# Patient Record
Sex: FEMALE
[De-identification: names, ages, dates, MRNs, and addresses within clinical notes are randomized per-mention and may not be internally consistent; named-entity substitution may affect disease eponyms.]

---

## 2020-07-06 ENCOUNTER — HOSPITAL ENCOUNTER (OUTPATIENT)
Dept: HOSPITAL 88 - RAD | Age: 65
End: 2020-07-06
Attending: INTERNAL MEDICINE
Payer: COMMERCIAL

## 2020-07-06 DIAGNOSIS — H02.402: Primary | ICD-10-CM

## 2020-07-06 PROCEDURE — 71046 X-RAY EXAM CHEST 2 VIEWS: CPT

## 2020-07-06 NOTE — DIAGNOSTIC IMAGING REPORT
EXAMINATION:  CHEST 2 VIEWS    



INDICATION: Ptosis



COMPARISON: None

     

FINDINGS:



LINES/TUBES:None



LUNGS:The lungs are well-inflated. No focal consolidation or pulmonary edema.



PLEURA:No pleural effusion or pneumothorax.



MEDIASTINUM:The cardiomediastinal silhouette appears normal in size and shape.



BONES/SOFT TISSUES:No acute osseous injury.



ABDOMEN:No free air under the diaphragm. Surgical clips overlie the upper

abdomen.





IMPRESSION: 

No focal pneumonia or pulmonary edema.



Signed by: Nicky Hermosillo MD on 7/6/2020 3:59 PM

## 2020-07-16 ENCOUNTER — HOSPITAL ENCOUNTER (INPATIENT)
Dept: HOSPITAL 88 - ER | Age: 65
LOS: 1 days | Discharge: HOME | DRG: 176 | End: 2020-07-17
Attending: INTERNAL MEDICINE | Admitting: INTERNAL MEDICINE
Payer: COMMERCIAL

## 2020-07-16 VITALS — SYSTOLIC BLOOD PRESSURE: 119 MMHG | DIASTOLIC BLOOD PRESSURE: 76 MMHG

## 2020-07-16 VITALS — SYSTOLIC BLOOD PRESSURE: 123 MMHG | DIASTOLIC BLOOD PRESSURE: 75 MMHG

## 2020-07-16 VITALS — DIASTOLIC BLOOD PRESSURE: 75 MMHG | SYSTOLIC BLOOD PRESSURE: 123 MMHG

## 2020-07-16 VITALS — DIASTOLIC BLOOD PRESSURE: 76 MMHG | SYSTOLIC BLOOD PRESSURE: 128 MMHG

## 2020-07-16 VITALS — HEIGHT: 65 IN | WEIGHT: 291.31 LBS | BODY MASS INDEX: 48.54 KG/M2

## 2020-07-16 VITALS — SYSTOLIC BLOOD PRESSURE: 128 MMHG | DIASTOLIC BLOOD PRESSURE: 76 MMHG

## 2020-07-16 VITALS — DIASTOLIC BLOOD PRESSURE: 50 MMHG | SYSTOLIC BLOOD PRESSURE: 114 MMHG

## 2020-07-16 DIAGNOSIS — E03.9: ICD-10-CM

## 2020-07-16 DIAGNOSIS — Z11.59: ICD-10-CM

## 2020-07-16 DIAGNOSIS — I26.99: Primary | ICD-10-CM

## 2020-07-16 DIAGNOSIS — Z88.8: ICD-10-CM

## 2020-07-16 DIAGNOSIS — R73.03: ICD-10-CM

## 2020-07-16 DIAGNOSIS — L40.50: ICD-10-CM

## 2020-07-16 DIAGNOSIS — R10.9: ICD-10-CM

## 2020-07-16 DIAGNOSIS — N17.9: ICD-10-CM

## 2020-07-16 DIAGNOSIS — N18.2: ICD-10-CM

## 2020-07-16 DIAGNOSIS — E66.01: ICD-10-CM

## 2020-07-16 DIAGNOSIS — Z80.0: ICD-10-CM

## 2020-07-16 DIAGNOSIS — Z82.49: ICD-10-CM

## 2020-07-16 DIAGNOSIS — I12.9: ICD-10-CM

## 2020-07-16 LAB
ALBUMIN SERPL-MCNC: 3.4 G/DL (ref 3.5–5)
ALBUMIN/GLOB SERPL: 0.8 {RATIO} (ref 0.8–2)
ALP SERPL-CCNC: 95 IU/L (ref 40–150)
ALT SERPL-CCNC: 12 IU/L (ref 0–55)
ANION GAP SERPL CALC-SCNC: 15.1 MMOL/L (ref 8–16)
BACTERIA URNS QL MICRO: (no result) /HPF
BASOPHILS # BLD AUTO: 0.1 10*3/UL (ref 0–0.1)
BASOPHILS NFR BLD AUTO: 0.5 % (ref 0–1)
BILIRUB UR QL: NEGATIVE
BUN SERPL-MCNC: 15 MG/DL (ref 7–26)
BUN/CREAT SERPL: 13 (ref 6–25)
CALCIUM SERPL-MCNC: 9.2 MG/DL (ref 8.4–10.2)
CHLORIDE SERPL-SCNC: 104 MMOL/L (ref 98–107)
CK MB SERPL-MCNC: 1.2 NG/ML (ref 0–5)
CK MB SERPL-MCNC: 1.3 NG/ML (ref 0–5)
CK MB SERPL-MCNC: 1.4 NG/ML (ref 0–5)
CK SERPL-CCNC: 49 IU/L (ref 29–168)
CK SERPL-CCNC: 58 IU/L (ref 29–168)
CK SERPL-CCNC: 66 IU/L (ref 29–168)
CLARITY UR: CLEAR
CO2 SERPL-SCNC: 26 MMOL/L (ref 22–29)
COLOR UR: YELLOW
DEPRECATED NEUTROPHILS # BLD AUTO: 11.5 10*3/UL (ref 2.1–6.9)
DEPRECATED RBC URNS MANUAL-ACNC: (no result) /HPF (ref 0–5)
EGFRCR SERPLBLD CKD-EPI 2021: 46 ML/MIN (ref 60–?)
EOSINOPHIL # BLD AUTO: 0.6 10*3/UL (ref 0–0.4)
EOSINOPHIL NFR BLD AUTO: 3.3 % (ref 0–6)
EPI CELLS URNS QL MICRO: (no result) /LPF
ERYTHROCYTE [DISTWIDTH] IN CORD BLOOD: 15.8 % (ref 11.7–14.4)
GLOBULIN PLAS-MCNC: 4.2 G/DL (ref 2.3–3.5)
GLUCOSE SERPLBLD-MCNC: 149 MG/DL (ref 74–118)
HCT VFR BLD AUTO: 41.8 % (ref 34.2–44.1)
HGB BLD-MCNC: 13.3 G/DL (ref 12–16)
KETONES UR QL STRIP.AUTO: NEGATIVE
LEUKOCYTE ESTERASE UR QL STRIP.AUTO: NEGATIVE
LIPASE SERPL-CCNC: 37 U/L (ref 8–78)
LYMPHOCYTES # BLD: 2.4 10*3/UL (ref 1–3.2)
LYMPHOCYTES NFR BLD AUTO: 14.7 % (ref 18–39.1)
MCH RBC QN AUTO: 28.4 PG (ref 28–32)
MCHC RBC AUTO-ENTMCNC: 31.8 G/DL (ref 31–35)
MCV RBC AUTO: 89.1 FL (ref 81–99)
MONOCYTES # BLD AUTO: 1.9 10*3/UL (ref 0.2–0.8)
MONOCYTES NFR BLD AUTO: 11.2 % (ref 4.4–11.3)
NEUTS SEG NFR BLD AUTO: 69.7 % (ref 38.7–80)
NITRITE UR QL STRIP.AUTO: NEGATIVE
PLATELET # BLD AUTO: 415 X10E3/UL (ref 140–360)
POTASSIUM SERPL-SCNC: 4.1 MMOL/L (ref 3.5–5.1)
PROT UR QL STRIP.AUTO: (no result)
RBC # BLD AUTO: 4.69 X10E6/UL (ref 3.6–5.1)
SODIUM SERPL-SCNC: 141 MMOL/L (ref 136–145)
SP GR UR STRIP: 1.01 (ref 1.01–1.02)
UROBILINOGEN UR STRIP-MCNC: 0.2 MG/DL (ref 0.2–1)
WBC #/AREA URNS HPF: (no result) /HPF (ref 0–5)

## 2020-07-16 PROCEDURE — 71045 X-RAY EXAM CHEST 1 VIEW: CPT

## 2020-07-16 PROCEDURE — 85025 COMPLETE CBC W/AUTO DIFF WBC: CPT

## 2020-07-16 PROCEDURE — 93306 TTE W/DOPPLER COMPLETE: CPT

## 2020-07-16 PROCEDURE — 74177 CT ABD & PELVIS W/CONTRAST: CPT

## 2020-07-16 PROCEDURE — 80053 COMPREHEN METABOLIC PANEL: CPT

## 2020-07-16 PROCEDURE — 82550 ASSAY OF CK (CPK): CPT

## 2020-07-16 PROCEDURE — 84484 ASSAY OF TROPONIN QUANT: CPT

## 2020-07-16 PROCEDURE — 36415 COLL VENOUS BLD VENIPUNCTURE: CPT

## 2020-07-16 PROCEDURE — 94640 AIRWAY INHALATION TREATMENT: CPT

## 2020-07-16 PROCEDURE — 99284 EMERGENCY DEPT VISIT MOD MDM: CPT

## 2020-07-16 PROCEDURE — 83690 ASSAY OF LIPASE: CPT

## 2020-07-16 PROCEDURE — 82553 CREATINE MB FRACTION: CPT

## 2020-07-16 PROCEDURE — 71260 CT THORAX DX C+: CPT

## 2020-07-16 PROCEDURE — 81001 URINALYSIS AUTO W/SCOPE: CPT

## 2020-07-16 PROCEDURE — 80048 BASIC METABOLIC PNL TOTAL CA: CPT

## 2020-07-16 RX ADMIN — LISINOPRIL SCH MG: 10 TABLET ORAL at 16:04

## 2020-07-16 RX ADMIN — Medication SCH MG: at 13:04

## 2020-07-16 RX ADMIN — DEXAMETHASONE SODIUM PHOSPHATE SCH MG: 4 INJECTION, SOLUTION INTRAMUSCULAR; INTRAVENOUS at 11:24

## 2020-07-16 RX ADMIN — Medication SCH ML: at 19:35

## 2020-07-16 RX ADMIN — SODIUM CHLORIDE SCH MLS/HR: 900 INJECTION, SOLUTION INTRAVENOUS at 11:24

## 2020-07-16 RX ADMIN — Medication SCH MG: at 21:21

## 2020-07-16 RX ADMIN — APIXABAN SCH MG: 5 TABLET, FILM COATED ORAL at 16:04

## 2020-07-16 RX ADMIN — Medication SCH MG: at 11:24

## 2020-07-16 RX ADMIN — Medication SCH ML: at 14:22

## 2020-07-16 NOTE — NUR
NOTIFIED DR. SHARMA OF PATIENT GOING IN AND OUT OF A SECOND DEGREE HEART BLOCK FOR 4 BEATS 
AND THEN BACK TO SINUS RHYTHM PER TELE TECH. NO NEW ORDERS AT THIS TIME.

## 2020-07-16 NOTE — HISTORY AND PHYSICAL
CHIEF COMPLAINT:  Shortness of breath.

 

HISTORY OF PRESENT ILLNESS:  This is a 65-year-old  woman, who presents 
to [facility] with a 4 to 5 day history of 
worsening

shortness of breath, particularly when taking a deep breath.  The patient denies
any

chest pain, just slight cough.  She complains of chills.  Denies any fever.  She
has

slight headache.  She also has body aches.  The patient denies any diarrhea.  
The

patient states she can still smell and taste food.  The patient states she has 
not been

exposed to anyone who is known to be COVID-19 positive.  The patient blood work 
done

recently at her primary care physician's office and her B-type natriuretic 
peptide level

was normal at 39.  She was found to have an elevated TSH level of 7.35.  Her 
hemoglobin

A1c was 5.8%.  In [facility], she was 
found have

BUN and creatinine of 15 and 1.18 respectively.  The patient's white blood cell 
count

16,500 with 69% segmenters.  Hemoglobin 13.3 g/dL, platelet count 415,000.  
Urinalysis

revealed 6-10 red blood cells per high-power field and 6-10 white blood cells 
per

high-power field with few bacteria.  The patient underwent a CT of the abdomen 
and

pelvis in the emergency room that revealed moderate bilateral perinephric 
stranding, but

no definite cortical striations were appreciated to suggest pyelonephritis.  
However,

the patient underwent a CT of the chest in the emergency room that did reveal 
multiple

pulmonary emboli involving the segmental arteries of the right upper, right 
middle and

right lower lobes.  The lungs however were clear of consolidations, masses or 
nodules.

The patient was admitted for further evaluation and treatment. 

 

REVIEW OF SYSTEMS:

GENERAL:  Weight is stable.  She has had chills in past five days.  No fever. 

HEENT:  Slight headaches, but no visual changes.  Denies any sore throat. 

RESPIRATORY:  Worsening shortness of breath over the last five days.  She 
currently

states that she has difficulty taking deep breath.  The patient denies any chest
pain

though.  She has slight nonproductive cough. 

GI:  No nausea, vomiting, diarrhea or constipation. 

:  No UTI symptoms. 

NEUROMUSCULAR:  No limb weakness or numbness, but she does complain of body 
aches.

 

ALLERGIES:  

1. REMICADE.

2. SULFAMETHOXAZOLE.

3. BACTRIM.

 

PAST MEDICAL HISTORY:  

1. Extreme obesity, BMI 48.

2. Psoriatic arthritis.

3. Prediabetes.

4. GERD.

5. Hypothyroidism.

6. Overactive bladder.

7. Chronic right pedal edema.

8. Hypertensive heart disease.

 

SOCIAL HISTORY:  This woman is , lives with adult daughter.  She is a 
retired

.  No history of tobacco or alcohol use. 

 

SURGICAL HISTORY:  

1. Lumbar epidural steroid injection recently.

2. 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

 

FAMILY HISTORY:  Numerous family members have hypertension.  Father had colon 
cancer.

No history of breast cancer. 

 

HOME MEDICATIONS:  

1. Furosemide 20 mg daily.

2. Omeprazole 40 mg daily.

3. Meloxicam 15 mg once a day.

4. Duloxetine 60 mg daily.

5. Gabapentin 300 mg t.i.d.

6. Tizanidine 4 mg q.h.s.

7. Levothyroxine 200 mcg every other day.

8. Diclofenac 1% gel applied to affected joints four times a day as needed.

9. Methotrexate 15 mg once a week.

10. Taltz 80 mg injectable every four weeks.

11. Myrbetriq 50 mg daily.

12. Folic acid 1 mg daily.

13. Lisinopril 10 mg daily.

 

PHYSICAL EXAMINATION:

GENERAL:  She is awake, alert, fully oriented.  She appears to be slightly 
dyspneic, but in no

acute respiratory distress.  She is fully oriented.  Very pleasant, cooperative 
on exam. 

VITAL SIGNS:  Height 5 feet 5 inches, weight 290 pounds.  BMI is 48.  Blood 
pressure is

124/74, pulse 74, respiratory rate 18, oxygen saturation 100% on room air, 
temperature

97.4. 

INTEGUMENT:  Skin is warm and dry.  No pallor, jaundice or diaphoresis. 

HEENT:  Anterior sclerae.  Moist mucous membranes. 

NECK:  Supple. 

CARDIOVASCULAR:  Distant heart sounds.  Regular rate and rhythm. 

LUNGS:  No rales.  No rhonchi or wheezes. 

ABDOMEN:  Obese, benign. 

EXTREMITIES:  No edema or deformity. 

NEUROLOGIC:  Intact.



DIAGNOSES:  

1. Right-sided pulmonary emboli.

2. COVID-19 infection, likely.

3. Extreme obesity, BMI 48.

4. Psoriatic arthritis.

5. Stage 2 chronic kidney disease.

 

PLAN:  

1. We will check COVID-19 infection by PCR testing (nasal swabbing).

2. Isolate the patient.

3. We will start oral apixaban.

4. Stop injectable enoxaparin.

5. Consult Pulmonary.

6. We will hold meloxicam and ibuprofen since patient has stage 2 chronic kidney
disease

and is now on anticoagulation. 

7. We will start intravenous dexamethasone for shortness of breath and likely 
COVID-19

infection. 

8. We will start intravenous azithromycin since patient most likely has COVID-19

infection. 

9. We will resume home medications. 

 

I spent 45 minutes in the care of the patient.

 

 

 

 

______________________________

MD HETAL Sheikh/ALMAZ

D:  07/16/2020 10:34:47

T:  07/16/2020 11:34:22

Job #:  [id number]

 

MTDD

## 2020-07-16 NOTE — CONSULTATION
DATE OF CONSULTATION:  

 

Pulmonary Critical Care Consultation 

 

CHIEF COMPLAINT:  Dyspnea.

 

HISTORY OF PRESENT ILLNESS:  The patient is a 65-year-old woman.  She has a history of

psoriatic arthritis.  She also has chronic back pain.  She noticed swelling in her right

leg intermittently for the past 1 to 2 months.  She also went for an epidural injection

the beginning of July.  She noticed difficulty breathing about 7 to 10 days ago.  She

denies any cough or fevers.  She has some pain with inspiration. 

 

She came to the hospital.  Her CT scan showed multiple pulmonary emboli.  She was

started on anticoagulation and has noticed some improvement, although she still has some

pain with respiration. 

 

PAST MEDICAL HISTORY:  

1. Psoriatic arthritis.

2. Chronic back pain.

3. Hypertension.

4. Gastroesophageal reflux.

 

ALLERGIES:  THE PATIENT REPORTS BEING ALLERGIC TO BACTRIM.

 

PAST SURGICAL HISTORY:  

1. Status post splenectomy.

2. Status post epidural steroid injectio.

 

SOCIAL HISTORY:  The patient has never been a smoker.  She is not a drinker.

 

FAMILY HISTORY:  There is a history of hypertension.  There is a history of colon cancer.

 

REVIEW OF SYSTEMS:

The patient denies any fever.  She has no headache.  She has not had any neck pain.  She

denies any is glandular swelling.  She is not having any anterior chest pain, although

she does have pain with inspiration.  She notes some dyspnea.  She has no cough.  She

has no abdominal pain.  She has no nausea or vomiting.  She does have intermittent leg

pain and swelling in the right leg. 

 

PHYSICAL EXAMINATION:

VITAL SIGNS:  The patient is afebrile.  The blood pressure is 114/50 and the pulse rate

is 81.  Saturation is 96%. 

HEENT:  Shows no facial swelling or erythema. 

CARDIAC:  Reveals regular rate and rhythm with normal S1 and S2. 

LUNGS:  Auscultation of lungs reveals clear breath sounds bilaterally.  There is no

wheezing. 

ABDOMEN:  Soft and nontender.  There is no rebound or guarding. 

EXTREMITIES:  Shows no leg edema or calf tenderness.  There is no cyanosis or clubbing. 

SKIN:  Shows no rashes. 

NEUROLOGICAL:  Shows no focal abnormalities.

LABORATORY DATA:  White blood cell count is 16.6, hemoglobin is 13.3, and the platelet

count is 415.  BUN to creatinine ratio is 15 to 1.18.  Other electrolytes are within

normal limits.  Albumin is 3.4. 

 

RADIOGRAPHIC DATA:  CT scan of the chest shows multiple pulmonary emboli involving

segmental arteries in the right side predominantly. 

 

CT scan of the abdomen and pelvis shows moderate bilateral perinephric stranding. 

 

Echocardiogram shows no right ventricular abnormalities.  There is a preserved ejection

fraction and some concentric left ventricular hypertrophy. 

 

IMPRESSION:  

1. Pulmonary emboli, possibly related to recent procedure (epidural steroid injection).

2. Acute kidney injury.

3. Psoriatic arthritis.

4. Hypertension.

5. Extreme obesity.

 

PLAN:  

1. The patient will require Eliquis for a minimum of 3 to 6 months.  She should be on 10

mg twice a day for the first week and can then transition to 5 mg twice a day. 

2. Await COVID testing, because the patient's with COVID are hypocoagulable and would be

at risk for pulmonary embolism. 

3. Follow up as an outpatient in 1 to 2 weeks to make sure the patient has had

symptomatic improvement. 

4. Repeat D-dimer at the end of treatment with anticoagulation.  If the D-dimer remains

elevated, then a workup for hypercoagulability would be indicated. 

 

 

 

 

______________________________

Francisco Laurent MD

 

[facility]

D:  07/16/2020 12:55:54

T:  07/16/2020 16:56:21

Job #:  [id number]

## 2020-07-16 NOTE — NUR
BEDSIDE SHIFT REPORT GIVEN TO ONCOMING NURSE. PATIENT IS RESTING IN BED. NO ACUTE DISTRESS 
NOTED AT THIS TIME. CALL LIGHT WITHIN REACH. BED IN THE LOWEST POSITION.

## 2020-07-16 NOTE — DIAGNOSTIC IMAGING REPORT
EXAMINATION: CT of the abdomen and pelvis with contrast.



TECHNIQUE: 

Spiral CT images of the abdomen and pelvis were performed from the lung bases

to the lesser trochanters after the intravenous administration of 100 cc of

Isovue 370 and the oral administration of water.  Coronal and sagittal

reformatted images were obtained.



COMPARISON:  None.



CLINICAL HISTORY:Left lower quadrant pain for one day

     

DISCUSSION:



ABDOMEN/PELVIS:



LOWER THORAX:Please see CT chest performed same day for further details



HEPATOBILIARY: No focal hepatic lesions  No intra or extrahepatic biliary

ductal dilation.



GALLBLADDER: No radio-opaque stones or sludge.  No wall thickening.



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 2.7 cm splenule



PANCREAS: No focal masses or ductal dilatation. Fatty replacement of the

pancreas.



ADRENALS: No adrenal nodules.



KIDNEYS/URETERS: Symmetrical renal enhancement.

No renal or ureteral calculi.

No hydronephrosis, hydroureter or evidence of obstruction.

Well-circumscribed fluid density lesions in the left renal sinus likely

represent peripelvic cysts.

Moderate bilateral perinephric stranding and mild bilateral perinephric fluid.

No definite cortical striations are identified.

No solid or cystic lesions.



PELVIC ORGANS/BLADDER: Bladder and uterus are unremarkable. No adnexal masses.



PERITONEUM/RETROPERITONEUM: No free air or fluid.



LYMPH NODES: No intra-abdominal, retroperitoneal, pelvic or inguinal

lymphadenopathy.



VESSELS: The celiac trunk,superior and inferior mesenteric and bilateral  renal

arteries are patent  The portal, superior mesenteric and splenic veins are

patent.



GI TRACT: Small hiatal hernia. No bowel dilation or evidence of obstruction. No

pericolonic inflammatory changes.



BONES AND SOFT TISSUE: No aggressive lytic or suspicious sclerotic lesions. 

Multilevel degenerated disks in the lower thoracic and to a lesser degree upper

lumbar spine.

Soft tissues are grossly unremarkable.



IMPRESSION: 



1. Moderate bilateral perinephric stranding and mild bilateral perinephric

fluid. Although no definite cortical striations are identified to suggest

pyelonephritis, correlate with urinalysis for diarrhea and possibility of

ascending infection.



Signed by: Dr. Jem Pelaez M.D. on 7/16/2020 4:45 AM

## 2020-07-16 NOTE — NUR
Received patient from ER. Patient received and offered a bed, patient in no acute distress, 
patient is currently stable, will continue to monitor.

## 2020-07-16 NOTE — DIAGNOSTIC IMAGING REPORT
EXAMINATION: CT of the chest with contrast, PE protocol.



TECHNIQUE:  Spiral CT images of the chest were performed from the lung apices

through the level of the adrenal glands after the IV administration of 150 cc

of Isovue-370.  Thin section reconstructions were obtained with special

concentration on the pulmonary arteries.

Coronal and sagittal reformatted images were also performed



COMPARISON: <none>



CLINICAL HISTORY:Shortness of breath for one week

     

DISCUSSION:



Lungs:  Multiple filling defects involving segmental arteries of the right

upper (superior and posterior segments), right middle (medial and lateral

segments) and right lower lobe (lateral and posterior basal segments).

Lungs are essentially clear. No consolidation, masses or nodules.

Airways are clear, without endobronchial lesions.



Pleura:  <There is no evidence of pleural effusion or pneumothorax.>



Heart and mediastinum:  Thyroid is unremarkable. Heart size is normal. No

pericardial effusion. Aorta is nonaneurysmal. Main pulmonary artery measures

2.8 cm, normal.



Lymph nodes: No mediastinal, hilar or axillary adenopathy.



Abdomen:  Please see CT abdomen and pelvis performed same date for further

detail.



Bones and soft tissues:  No aggressive lytic or suspicious focal sclerotic

lesion. Soft tissues are grossly unremarkable.



IMPRESSION: 



1. Multiple pulmonary emboli involving segmental arteries of the right upper,

right middle and right lower lobe.



2. Lungs are essentially clear, without consolidation, masses or nodules.









Signed by: Dr. Jem Pelaez M.D. on 7/16/2020 4:56 AM

## 2020-07-16 NOTE — NUR
Received patient from day nurse, patient is stable, fall and safety precautions maintained 
at this time: bed in lowest position and locked, needed items beside bed and call bell 
placed close to patient, patient instructed to use it to call nurses for any assistance 
needed, patient verbalized understanding. Patient is currently stable will continue to 
monitor.

## 2020-07-16 NOTE — EMERGENCY DEPARTMENT NOTE
History of Present Illnes


History of Present Illness


History of Present Illness


This is a 65 year old  female with week long h/o of abd pain  . Seen in 

triage , slightly dyspneic


Historian:  Patient


Onset (how long ago):  week(s) (1)


Radiation:  Reports abdomen


Severity:  moderate


Duration (how long):  day(s) (1)


Timing of current episode:  constant


Progression:  worsening


Chronicity:  new


Relieving factors:  none


Exacerbating factors:  none


Associated symptoms:  Reports nausea/vomiting


Treatments prior to arrival:  none





Past Medical/Family History


Physician Review


I have reviewed the patient's past medical and family history.  Any updates have

been documented here.





Past Medical History


Recent Fever:  No


Clinical Suspicion of Infectio:  No


New/Unexplained Change in Ment:  No


Past Medical History:  Hypertension


Past Surgical History:  None





Social History


Smoking Cessation:  Never Smoker


Alcohol Use:  None


Any Illegal Drug Use:  No





Review of Systems


Review of Systems


Constitutional:  Reports no symptoms


EENTM:  Reports no symptoms


Cardiovascular:  Reports no symptoms


Respiratory:  Reports dyspnea


Gastrointestinal:  Reports abdominal pain, Reports nausea


Genitourinary:  Reports no symptoms


Musculoskeletal:  Reports no symptoms


Integumentary:  Reports no symptoms


Neurological:  Reports no symptoms


Psychological:  Reports no symptoms


Endocrine:  Reports no symptoms


Hematological/Lymphatic:  Reports no symptoms





Physical Exam


Related Data


Allergies:  


Coded Allergies:  


     infliximab (Verified  Allergy, Unknown, HIVES, 17)


     sulfamethoxazole (Verified  Allergy, Unknown, TONGUE SWELLED, 17)


     trimethoprim (Verified  Allergy, Unknown, TONGUE SWELLED, 17)


Triage Vital Signs





Vital Signs








  Date Time  Temp Pulse Resp B/P (MAP) Pulse Ox O2 Delivery O2 Flow Rate FiO2


 


20 00:53 98.1 81 13 134/83 100 Room Air  








Vital signs reviewed:  Yes





Physical Exam


CONSTITUTIONAL





Constitutional:  Present morbidly obese, Present ill appearing


HENT


HENT:  Present normocephalic, Present atraumatic, Present oropharynx mary alice

r/moist, Present nose normal


HENT L/R:  Present left ext ear normal, Present right ext ear normal


EYES





Eyes:  Reports PERRL, Reports conjunctivae normal


NECK


Neck:  Present ROM normal


PULMONARY


Pulmonary:  Present effort normal, Present breath sounds normal


CARDIOVASCULAR





Cardiovascular:  Present regular rhythm, Present heart sounds normal, Present 

capillary refill normal, Present normal rate


GASTROINTESTINAL





Abdominal:  Present soft, Present tender (LLQ)


GENITOURINARY





Genitourinary:  Present exam deferred


SKIN


Skin:  Present warm, Present dry


MUSCULOSKELETAL





Musculoskeletal:  Present ROM normal


NEUROLOGICAL





Neurological:  Present alert, Present oriented x 3, Present no gross motor or 

sensory deficits


PSYCHOLOGICAL


Psychological:  Present mood/affect normal, Present judgement normal





Results


Laboratory


Laboratory





Laboratory Tests








Test


 20


20:10 20


15:20


 


Creatine Kinase


 49 IU/L


() 58 IU/L


()


 


Creatine Kinase MB


 1.20 ng/mL


(0-5.0) 1.30 ng/mL


(0-5.0)


 


Troponin I


 0.001 ng/mL


(0-0.300) 0.016 ng/mL


(0-0.300)








Lab results reviewed:  Yes





Imaging


Imaging results reviewed:  Yes


Impressions


                                        


                        [place] [address]








Patient Name: PARMJIT YE                          MR #: [id number]         


    


: 1955                         Age/Sex: 65/F


Acct #: [id number]                    Req #: [id number]


Adm Physician:                                      


Ordered by: JOYCE MCKEON DO                    Report #: [id number] 


Location: ER                            Room/Bed:           


                                                                                


________________________________________________________________________________


___________________





Procedure: [id number] CT/CT CHEST W


Exam Date: 20                            Exam Time: 0320








                              REPORT STATUS: Signed





EXAMINATION: CT of the chest with contrast, PE protocol.





TECHNIQUE:  Spiral CT images of the chest were performed from the lung apices


through the level of the adrenal glands after the IV administration of 150 cc


of Isovue-370.  Thin section reconstructions were obtained with special


concentration on the pulmonary arteries.


Coronal and sagittal reformatted images were also performed





COMPARISON: <none>





CLINICAL HISTORY:Shortness of breath for one week


     


DISCUSSION:





Lungs:  Multiple filling defects involving segmental arteries of the right


upper (superior and posterior segments), right middle (medial and lateral


segments) and right lower lobe (lateral and posterior basal segments).


Lungs are essentially clear. No consolidation, masses or nodules.


Airways are clear, without endobronchial lesions.





Pleura:  <There is no evidence of pleural effusion or pneumothorax.>





Heart and mediastinum:  Thyroid is unremarkable. Heart size is normal. No


pericardial effusion. Aorta is nonaneurysmal. Main pulmonary artery measures


2.8 cm, normal.





Lymph nodes: No mediastinal, hilar or axillary adenopathy.





Abdomen:  Please see CT abdomen and pelvis performed same date for further


detail.





Bones and soft tissues:  No aggressive lytic or suspicious focal sclerotic


lesion. Soft tissues are grossly unremarkable.





IMPRESSION: 





1. Multiple pulmonary emboli involving segmental arteries of the right upper,


right middle and right lower lobe.





2. Lungs are essentially clear, without consolidation, masses or nodules.














Signed by: Dr. Jacquie Pelaez M.D. on 2020 4:56 AM








Dictated By: JACQUIE PELAEZ MD


Electronically Signed By: JACQUIE PELAEZ MD on 20


Transcribed By: ONI on 20 








COPY TO:   JOYCE MCKEON DO~














                        [place] [address]








Patient Name: PARMJIT YE                          MR #: [id number]         


    


: 1955                         Age/Sex: 65/F


Acct #: [id number]                    Req #: [id number]


Adm Physician:                                      


Ordered by: JOYCE MCKEON DO                    Report #: [id number] 


Location: ER                            Room/Bed:           


                                                                                


________________________________________________________________________________


___________________





Procedure: [id number] CT/CT ABDOMEN/PELVIS W


Exam Date: 20                            Exam Time: 0320








                              REPORT STATUS: Signed





EXAMINATION: CT of the abdomen and pelvis with contrast.





TECHNIQUE: 


Spiral CT images of the abdomen and pelvis were performed from the lung bases


to the lesser trochanters after the intravenous administration of 100 cc of


Isovue 370 and the oral administration of water.  Coronal and sagittal


reformatted images were obtained.





COMPARISON:  None.





CLINICAL HISTORY:Left lower quadrant pain for one day


     


DISCUSSION:





ABDOMEN/PELVIS:





LOWER THORAX:Please see CT chest performed same day for further details





HEPATOBILIARY: No focal hepatic lesions  No intra or extrahepatic biliary


ductal dilation.





GALLBLADDER: No radio-opaque stones or sludge.  No wall thickening.





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 2.7 cm splenule





PANCREAS: No focal masses or ductal dilatation. Fatty replacement of the


pancreas.





ADRENALS: No adrenal nodules.





KIDNEYS/URETERS: Symmetrical renal enhancement.


No renal or ureteral calculi.


No hydronephrosis, hydroureter or evidence of obstruction.


Well-circumscribed fluid density lesions in the left renal sinus likely


represent peripelvic cysts.


Moderate bilateral perinephric stranding and mild bilateral perinephric fluid.


No definite cortical striations are identified.


No solid or cystic lesions.





PELVIC ORGANS/BLADDER: Bladder and uterus are unremarkable. No adnexal masses.





PERITONEUM/RETROPERITONEUM: No free air or fluid.





LYMPH NODES: No intra-abdominal, retroperitoneal, pelvic or inguinal


lymphadenopathy.





VESSELS: The celiac trunk,superior and inferior mesenteric and bilateral  renal


arteries are patent  The portal, superior mesenteric and splenic veins are


patent.





GI TRACT: Small hiatal hernia. No bowel dilation or evidence of obstruction. No


pericolonic inflammatory changes.





BONES AND SOFT TISSUE: No aggressive lytic or suspicious sclerotic lesions. 


Multilevel degenerated disks in the lower thoracic and to a lesser degree upper


lumbar spine.


Soft tissues are grossly unremarkable.





IMPRESSION: 





1. Moderate bilateral perinephric stranding and mild bilateral perinephric


fluid. Although no definite cortical striations are identified to suggest


pyelonephritis, correlate with urinalysis for diarrhea and possibility of


ascending infection.





Signed by: Dr. Jacquie Pelaez M.D. on 2020 4:45 AM








Dictated By: JACQUIE PELAEZ MD


Electronically Signed By: JACQUIE PELAEZ MD on 20


Transcribed By: ONI on 20 








COPY TO:   JOYCE MCKEON DO~





Critical Care Time


Total Critical Care Time (min):  31


Critcal care time spent by me:  discussion w consultants, discussion w primary 

provider, examination of patient, order/perform tx or interventions, 

order/review laboratory studies, order/review radiographic studies, pulse 

oximetry, re-evaluation of patient condition





Assessment & Plan


Medical Decision Making


MDM


65 yom with  presents with abdominal pain. CBC, CMP,  UA and CTS ordered to r/o 

appendicitis, COVID-19 infection diverticulitis, UTI, kidney stone, perforated 

viscus, obstruction, ischemia, and biliary pathology. Dyspnea concerning for PE 

which was confirmed by CTA of chest. Patient to admitted to the service of Dr REBECA Izaguirre





Assessment & Plan


Final Impression:  


(1) Pulmonary embolism


(2) Abdominal pain


Depart Disposition:  ADMITTED


Home Meds


Reported Medications


Lisinopril (LISINOPRIL) 10 Mg Tablet, 10 MG PO BID, #60 TAB


   20


Meloxicam (MOBIC) 15 Mg Tablet, 15 MG PO DAILY, TAB


   20


Duloxetine Hcl (CYMBALTA) 30 Mg Capsule.dr, 60 MG PO DAILY, #30 CAP


   20


Gabapentin (GABAPENTIN) 300 Mg Capsule, 600 MG PO Q8H, #60 CAP


   20


Tizanidine Hcl (TIZANIDINE HCL) 4 Mg Capsule, 4 MG PO HS


   20


Folic Acid (FOLIC ACID) 0.8 Mg Tablet, 1 MG PO DAILY


   20


Ibuprofen (IBUPROFEN) 400 Mg Tablet, 800 MG PO DAILY PRN for PAIN, TAB


   17


Methotrexate Sodium (METHOTREXATE) 2.5 Mg Tablet, 20 MG PO WKLY, #30 TAB


   WEEKLY ON SUNDAYS


   9/19/17


Levothyroxine Sodium (LEVOTHYROXINE SODIUM) 200 Mcg Tablet, 200 MCG PO 

DAILY@0600


   17


Oxybutynin Chloride (OXYBUTYNIN CHLORIDE ER) 15 Mg Tab.er.24, 15 MG PO DAILY


   17


Discontinued Reported Medications


[Bupropion]   No Conflict Check, PO DAILY


   PATIENT STATES UNKNOWN DOSAGE


   17


Prednisone (PREDNISONE) 5 Mg Tablet, 5 MG PO DAILY


   17


Hydrocodone Bit/Acetaminophen (HYDROCODON-ACETAMINOPH 7.5-325) 1 Each Tablet, PO

 PRN


   17


[Folic Acid]   No Conflict Check, 1 MG PO DAILY


   17











JOYCE MCKEON DO                2020 00:25

## 2020-07-16 NOTE — NUR
ADMITTED PATIENT FROM ER. SHE IS IN STABLE CONDITION. ORIENTED TO ROOM AND POLICIES. CALL 
LIGHT WITHIN REACH. BED IN THE LOWEST POSITION.

## 2020-07-17 VITALS — DIASTOLIC BLOOD PRESSURE: 62 MMHG | SYSTOLIC BLOOD PRESSURE: 112 MMHG

## 2020-07-17 VITALS — DIASTOLIC BLOOD PRESSURE: 72 MMHG | SYSTOLIC BLOOD PRESSURE: 122 MMHG

## 2020-07-17 VITALS — DIASTOLIC BLOOD PRESSURE: 54 MMHG | SYSTOLIC BLOOD PRESSURE: 109 MMHG

## 2020-07-17 VITALS — DIASTOLIC BLOOD PRESSURE: 86 MMHG | SYSTOLIC BLOOD PRESSURE: 134 MMHG

## 2020-07-17 LAB
ALBUMIN SERPL-MCNC: 2.8 G/DL (ref 3.5–5)
ALBUMIN/GLOB SERPL: 0.8 {RATIO} (ref 0.8–2)
ALP SERPL-CCNC: 76 IU/L (ref 40–150)
ALT SERPL-CCNC: 10 IU/L (ref 0–55)
ANION GAP SERPL CALC-SCNC: 12.3 MMOL/L (ref 8–16)
ANION GAP SERPL CALC-SCNC: 9.2 MMOL/L (ref 8–16)
BASOPHILS # BLD AUTO: 0 10*3/UL (ref 0–0.1)
BASOPHILS NFR BLD AUTO: 0.1 % (ref 0–1)
BUN SERPL-MCNC: 21 MG/DL (ref 7–26)
BUN SERPL-MCNC: 22 MG/DL (ref 7–26)
BUN/CREAT SERPL: 12 (ref 6–25)
BUN/CREAT SERPL: 16 (ref 6–25)
CALCIUM SERPL-MCNC: 8.3 MG/DL (ref 8.4–10.2)
CALCIUM SERPL-MCNC: 8.8 MG/DL (ref 8.4–10.2)
CHLORIDE SERPL-SCNC: 107 MMOL/L (ref 98–107)
CHLORIDE SERPL-SCNC: 108 MMOL/L (ref 98–107)
CO2 SERPL-SCNC: 24 MMOL/L (ref 22–29)
CO2 SERPL-SCNC: 26 MMOL/L (ref 22–29)
DEPRECATED NEUTROPHILS # BLD AUTO: 10.1 10*3/UL (ref 2.1–6.9)
EGFRCR SERPLBLD CKD-EPI 2021: 28 ML/MIN (ref 60–?)
EGFRCR SERPLBLD CKD-EPI 2021: 41 ML/MIN (ref 60–?)
EOSINOPHIL # BLD AUTO: 0 10*3/UL (ref 0–0.4)
EOSINOPHIL NFR BLD AUTO: 0 % (ref 0–6)
ERYTHROCYTE [DISTWIDTH] IN CORD BLOOD: 15.9 % (ref 11.7–14.4)
GLOBULIN PLAS-MCNC: 3.7 G/DL (ref 2.3–3.5)
GLUCOSE SERPLBLD-MCNC: 140 MG/DL (ref 74–118)
GLUCOSE SERPLBLD-MCNC: 149 MG/DL (ref 74–118)
HCT VFR BLD AUTO: 35.7 % (ref 34.2–44.1)
HGB BLD-MCNC: 11.3 G/DL (ref 12–16)
LYMPHOCYTES # BLD: 2.4 10*3/UL (ref 1–3.2)
LYMPHOCYTES NFR BLD AUTO: 17.8 % (ref 18–39.1)
MCH RBC QN AUTO: 28.7 PG (ref 28–32)
MCHC RBC AUTO-ENTMCNC: 31.7 G/DL (ref 31–35)
MCV RBC AUTO: 90.6 FL (ref 81–99)
MONOCYTES # BLD AUTO: 0.9 10*3/UL (ref 0.2–0.8)
MONOCYTES NFR BLD AUTO: 7 % (ref 4.4–11.3)
NEUTS SEG NFR BLD AUTO: 74.7 % (ref 38.7–80)
PLATELET # BLD AUTO: 339 X10E3/UL (ref 140–360)
POTASSIUM SERPL-SCNC: 4.2 MMOL/L (ref 3.5–5.1)
POTASSIUM SERPL-SCNC: 4.3 MMOL/L (ref 3.5–5.1)
RBC # BLD AUTO: 3.94 X10E6/UL (ref 3.6–5.1)
SODIUM SERPL-SCNC: 139 MMOL/L (ref 136–145)
SODIUM SERPL-SCNC: 139 MMOL/L (ref 136–145)

## 2020-07-17 RX ADMIN — APIXABAN SCH MG: 5 TABLET, FILM COATED ORAL at 09:05

## 2020-07-17 RX ADMIN — Medication SCH MG: at 09:05

## 2020-07-17 RX ADMIN — SODIUM CHLORIDE SCH MLS/HR: 900 INJECTION, SOLUTION INTRAVENOUS at 10:38

## 2020-07-17 RX ADMIN — Medication SCH MG: at 13:44

## 2020-07-17 RX ADMIN — Medication SCH ML: at 14:02

## 2020-07-17 RX ADMIN — Medication SCH ML: at 00:55

## 2020-07-17 RX ADMIN — Medication SCH ML: at 07:00

## 2020-07-17 RX ADMIN — LISINOPRIL SCH MG: 10 TABLET ORAL at 09:05

## 2020-07-17 RX ADMIN — Medication SCH MG: at 06:21

## 2020-07-17 RX ADMIN — DEXAMETHASONE SODIUM PHOSPHATE SCH MG: 4 INJECTION, SOLUTION INTRAMUSCULAR; INTRAVENOUS at 09:30

## 2020-07-17 NOTE — NUR
RECEIVED BEDSIDE SHIFT REPORT FROM OFF GOING NURSE. PATIENT IS RESTING IN BED. NO ACUTE 
DISTRESS NOTED. CALL LIGHT WITHIN REACH. BED IN THE LOWEST POSITION.

## 2020-07-17 NOTE — DIAGNOSTIC IMAGING REPORT
EXAM:  CHEST SINGLE (PORTABLE)



DATE: 7/17/2020 8:32 AM  



INDICATION: Possible pneumonia 



COMPARISON: CT chest with contrast from 7/16/2020



FINDINGS:

The trachea is midline. The lungs are symmetrically expanded without evidence

for large focal consolidation, pneumothorax, or significant pleural effusion.



The cardiomediastinal is magnified by technique but otherwise unremarkable. The

pulmonary vasculature is not engorged. No acute osseous abnormality is

identified. The surrounding soft tissues are unremarkable.





IMPRESSION:



No acute cardiopulmonary process identified.



Signed by: Dr. Otis Denise MD on 7/17/2020 8:53 AM

## 2020-07-17 NOTE — DISCHARGE SUMMARY
ADMITTING DIAGNOSES:  

1. Right-sided pulmonary emboli.

2. Coronavirus disease-19, viral infection, likely.

3. Extreme obesity, BMI 48.

4. Psoriatic arthritis.

5. Stage 2 chronic kidney disease.

 

DISCHARGE DIAGNOSES:  

1. Right-sided pulmonary emboli, stable.

2. Coronavirus disease-19, viral infection, likely.

3. Extreme obesity, BMI 48.

4. Psoriatic arthritis.

5. Acute on chronic renal insufficiency secondary to intravenous contrast, 
stable.

 

HOSPITAL COURSE:  This is a 65-year-old  woman, who was initially 
admitted to

[facility] with diagnosis of right-sided 
pulmonary

emboli as well as likely coronavirus disease-19 viral infection.  However, on 
discharge

the patient's COVID-19 test by PCR was still pending on day that she was 
discharged.

The patient remained hemodynamically stable during hospitalization.  The patient
never

became hypoxic.  In fact, she never had shortness of breath.  Her only real 
complaint

was difficulty in taking a deep breath and right-sided pleuritic chest 
discomfort.  On

admission, the patient underwent a CT of the abdomen and pelvis, which revealed

perinephric stranding, but no clear evidence of pyelonephritis.  However, 
patient

underwent a CT of the chest with intravenous contrast during this 
hospitalization that

revealed multiple pulmonary emboli involving segmental arteries of the right 
upper,

right middle and right lower lobes.  The patient's brief hospitalization was

unremarkable.  She was seen by pulmonologist, Dr. Francisco Laurent.  The patient was

initially started on enoxaparin subcutaneously, but later was transitioned to 
oral

apixaban, which she tolerated quite well.  The patient was started on apixaban 
from

Eliquis 10 mg twice a day.  The following medications were discontinued during 
this

hospitalization because of her acute on chronic renal insufficiency, namely 
ibuprofen,

meloxicam, and lisinopril. Prior to discharge she was administered one liter of 
saline 

intravenously to help correct the likely intravenous contrast induced acute 
renal 

insufficiency.  Basic metabolic profile was drawn prior to discharge.

 

CONDITION ON DISCHARGE:  Stable.

 

DISCHARGE MEDICATIONS:  

1. Apixaban from Eliquis 10 mg twice a day for seven more days, then 5 mg b.i.d.

thereafter for at least three more months. 

2. Oxybutynin XL 15 mg daily.

3. Folic acid 1 mg daily.

4. Duloxetine 60 mg daily.

5. Levothyroxine 200 mcg daily.

6. Gabapentin 600 mg t.i.d.

7. Tizanidine 4 mg at bedtime p.r.n. muscle spasms.

8. Azithromycin 500 mg daily for five days.

9. Dexamethasone 4 mg p.o. b.i.d. for five days (only if she is COVID-19 
positive).

10. Zinc sulfate 220 mg b.i.d. for 10 days.

11. Acetaminophen 650 mg every 6 hours p.r.n. temperature 99.5 or higher, or 
pain.

12. Methotrexate 20 mg once a week on Sundays. 

 

The patient was instructed to follow up with her primary care physician, namely 
myself,

Dr. John Paul Izaguirre within the next 7-10 days.  I will personally call the patient
with

results of the COVID-19 viral tests once available. 

 

 

 

 

______________________________

MD MAKI SheikhO/MODL

D:  07/17/2020 09:39:04

T:  07/17/2020 10:06:28

Job #:  [id number]

 

cc:            Francisco Laurent MD

 

MTDD

## 2020-07-17 NOTE — NUR
RECEIVED DISCHARGE ORDER FROM DR. SHARMA. PATIENT IS IN STABLE CONDITION. IV LINE TO LEFT 
ANTECUBITAL DISCONTINUED WITH TIP INTACT, PRESSURE DRESSING APPLIED TO SITE. DISCHARGE 
TEACHING PROVIDED, PATIENT VERBALIZED UNDERSTANDING, NO QUESTIONS AT THE TIME OF DISCHARGE. 
DC PAPERWORK WITH PRESCRIPTIONS AND ALL PERSONAL ITEMS ON HAND. PATIENT ACCOMPANIED TO 
PRIVATE AUTO VIA WHEELCHAIR BY STAFF.

## 2020-08-01 ENCOUNTER — HOSPITAL ENCOUNTER (EMERGENCY)
Dept: HOSPITAL 88 - ER | Age: 65
Discharge: HOME | End: 2020-08-01
Payer: COMMERCIAL

## 2020-08-01 VITALS — BODY MASS INDEX: 48.82 KG/M2 | WEIGHT: 293 LBS | HEIGHT: 65 IN

## 2020-08-01 VITALS — DIASTOLIC BLOOD PRESSURE: 73 MMHG | SYSTOLIC BLOOD PRESSURE: 134 MMHG

## 2020-08-01 DIAGNOSIS — T40.4X5A: ICD-10-CM

## 2020-08-01 DIAGNOSIS — Z86.718: ICD-10-CM

## 2020-08-01 DIAGNOSIS — I10: ICD-10-CM

## 2020-08-01 DIAGNOSIS — L40.50: ICD-10-CM

## 2020-08-01 DIAGNOSIS — R11.2: Primary | ICD-10-CM

## 2020-08-01 DIAGNOSIS — E03.9: ICD-10-CM

## 2020-08-01 LAB
ALBUMIN SERPL-MCNC: 3.3 G/DL (ref 3.5–5)
ALBUMIN/GLOB SERPL: 0.8 {RATIO} (ref 0.8–2)
ALP SERPL-CCNC: 89 IU/L (ref 40–150)
ALT SERPL-CCNC: 10 IU/L (ref 0–55)
ANION GAP SERPL CALC-SCNC: 13.6 MMOL/L (ref 8–16)
BASOPHILS # BLD AUTO: 0.1 10*3/UL (ref 0–0.1)
BASOPHILS NFR BLD AUTO: 0.7 % (ref 0–1)
BUN SERPL-MCNC: 9 MG/DL (ref 7–26)
BUN/CREAT SERPL: 13 (ref 6–25)
CALCIUM SERPL-MCNC: 9.2 MG/DL (ref 8.4–10.2)
CHLORIDE SERPL-SCNC: 105 MMOL/L (ref 98–107)
CK MB SERPL-MCNC: 1.3 NG/ML (ref 0–5)
CK SERPL-CCNC: 61 IU/L (ref 29–168)
CO2 SERPL-SCNC: 24 MMOL/L (ref 22–29)
DEPRECATED APTT PLAS QN: 34.3 SECONDS (ref 23.8–35.5)
DEPRECATED INR PLAS: 1.02
DEPRECATED NEUTROPHILS # BLD AUTO: 4.3 10*3/UL (ref 2.1–6.9)
EGFRCR SERPLBLD CKD-EPI 2021: > 60 ML/MIN (ref 60–?)
EOSINOPHIL # BLD AUTO: 0.2 10*3/UL (ref 0–0.4)
EOSINOPHIL NFR BLD AUTO: 2.2 % (ref 0–6)
ERYTHROCYTE [DISTWIDTH] IN CORD BLOOD: 15.8 % (ref 11.7–14.4)
GLOBULIN PLAS-MCNC: 4.2 G/DL (ref 2.3–3.5)
GLUCOSE SERPLBLD-MCNC: 121 MG/DL (ref 74–118)
HCT VFR BLD AUTO: 39.6 % (ref 34.2–44.1)
HGB BLD-MCNC: 12.9 G/DL (ref 12–16)
LIPASE SERPL-CCNC: 9 U/L (ref 8–78)
LYMPHOCYTES # BLD: 2.9 10*3/UL (ref 1–3.2)
LYMPHOCYTES NFR BLD AUTO: 35.6 % (ref 18–39.1)
MCH RBC QN AUTO: 27.9 PG (ref 28–32)
MCHC RBC AUTO-ENTMCNC: 32.6 G/DL (ref 31–35)
MCV RBC AUTO: 85.5 FL (ref 81–99)
MONOCYTES # BLD AUTO: 0.7 10*3/UL (ref 0.2–0.8)
MONOCYTES NFR BLD AUTO: 8.9 % (ref 4.4–11.3)
NEUTS SEG NFR BLD AUTO: 52.2 % (ref 38.7–80)
PLATELET # BLD AUTO: 397 X10E3/UL (ref 140–360)
POTASSIUM SERPL-SCNC: 3.6 MMOL/L (ref 3.5–5.1)
PROTHROMBIN TIME: 13.9 SECONDS (ref 11.9–14.5)
RBC # BLD AUTO: 4.63 X10E6/UL (ref 3.6–5.1)
SODIUM SERPL-SCNC: 139 MMOL/L (ref 136–145)

## 2020-08-01 PROCEDURE — 85025 COMPLETE CBC W/AUTO DIFF WBC: CPT

## 2020-08-01 PROCEDURE — 83690 ASSAY OF LIPASE: CPT

## 2020-08-01 PROCEDURE — 99284 EMERGENCY DEPT VISIT MOD MDM: CPT

## 2020-08-01 PROCEDURE — 84484 ASSAY OF TROPONIN QUANT: CPT

## 2020-08-01 PROCEDURE — 85730 THROMBOPLASTIN TIME PARTIAL: CPT

## 2020-08-01 PROCEDURE — 82553 CREATINE MB FRACTION: CPT

## 2020-08-01 PROCEDURE — 80053 COMPREHEN METABOLIC PANEL: CPT

## 2020-08-01 PROCEDURE — 85610 PROTHROMBIN TIME: CPT

## 2020-08-01 PROCEDURE — 36415 COLL VENOUS BLD VENIPUNCTURE: CPT

## 2020-08-01 PROCEDURE — 82550 ASSAY OF CK (CPK): CPT

## 2020-08-01 NOTE — XMS REPORT
Continuity of Care Document

                             Created on: 2020



PARMJIT YE

External Reference #: [id number]

: 1955

Sex: Female



Demographics





                          Address                   [address]

[place]  [address]

 

                          Home Phone                [phone]

 

                          Preferred Language        English

 

                          Marital Status            D

 

                          Anabaptist Affiliation     Unknown

 

                          Race                      Other

 

                          Additional Race(s)        White



 

                          Ethnic Group              /Latin





Author





                          Author                    [facility]

t

 

                          Organization              [facility]

 

                          Address                   [address]

[place]  [address]



 

                          Phone                     Unavailable







Support





                Name            Relationship    Address         Phone

 

                    KERI ANGELICA   ECON                [place]

[place]  [address]                      Unavailable

 

                Foster  Parisa ECON            Unknown         [phone]

[phone]







Care Team Providers





                    Care Team Member Name Role                Phone

 

                    MD MISAEL SHARMA  PCP                 [phone]

 

                    ALEX SHARMA    Attphys             Unavailable

 

                    ALEX SHARMA    Admphys             Unavailable







Payers





           Payer Name Policy Type Policy Number Effective Date Expiration Date MIRNA Du o             [id number] 2008 00:00:00            [facility]







Problems





           Condition Name Condition Details Condition Category Status     Onset 

Date Resolution

Date            Last Treatment Date Treating Clinician Comments        Source

 

       Abdominal pain        Problem Active                                    C

[facility]

 

       Pulmonary embolism        Problem Active                                 

   [facility]







Allergies, Adverse Reactions, Alerts





        Allergy Name Allergy Type Status  Severity Reaction(s) Onset Date Inacti

ve Date 

Treating Clinician        Comments                  Source

 

             Sulfamethoxazole-Trimethoprim Propensity to adverse reactions Activ

e                    

Anaphylaxis  2018 00:00:00                                        [facility]

 

        Infliximab Propensity to adverse reactions Active          Hives    00:00:00         

                                                    [facility]

r

 

           Sulfamethoxazole Allergy to substance Active                TONGUE SW

ELLED 2017 

00:00:00                                                        [facility]

 

        Trimethoprim Allergy to substance Active          TONGUE SWELLED 2017 00:00:00         

                                                    [facility]

 

       Infliximab Allergy to substance Active        HIVES  2017 00:00:00 

                     [facility]







Social History





           Social Habit Start Date Stop Date  Quantity   Comments   Source

 

           Sex Assigned At Birth                                             [facility]







                Smoking Status  Start Date      Stop Date       Source

 

                Never smoker                                    [facility]







Medications





             Ordered Medication Name Filled Medication Name Start Date   Stop Da

te    Current 

Medication? Ordering Clinician Indication Dosage     Frequency  Signature (SIG) 

Comments                  Components                Source

 

       ADALIMUMAB (HUMIRA SUBQ)        2018 13:59:27        Yes           

                     Inject 

subcutaneously.                                             [facility]

 

        oxybutynin (DITROPAN XL) 15 MG 24 hr tablet         2018 09:25:24 

        Yes                     15mg

             QD           Take 15 mg by mouth daily.                           C

[facility]

 

       predniSONE (DELTASONE) 5 MG tablet        2018 09:25:24        Yes 

                 5mg    QD     Take 5

mg by mouth daily.                                          [facility]

 

       folic acid (FOLVITE) 1 MG tablet        2018 09:25:23        Yes   

               1mg    QD     Take 1 

mg by mouth daily.                                          [facility]

 

                    HYDROcodone-acetaminophen (NORCO 7.5-325) 7.5-325 mg per tab

let                     2018 

09:25:23            Yes                           1{tbl}              Take 1 tab

let by mouth every 6 (six) hours as needed

for Pain.                                                   [facility]

 

       ibuprofen (ADVIL,MOTRIN) 600 MG tablet        2018 09:25:23        

Yes                  600mg         

Take 600 mg by mouth every 6 (six) hours as needed for Pain.                    

                     [facility]

 

             levothyroxine (SYNTHROID, LEVOTHROID) 200 MCG tablet              2

 09:25:23              Yes

                        200ug           Take 200 mcg by mouth Every morning on a

n empty stomach.                 [facility]

 

       methotrexate 2.5 MG tablet        2018 09:25:23        Yes         

                Q7D    Take by mouth 

once a week.                                                [facility]

 

                    omeprazole-sodium bicarbonate (ZEGERID) 40-1.1 mg-gram per c

apsule                     2018 

09:25:23            Yes                           1{capsule}           Take 1 ca

psule by mouth every morning before 

breakfast.                                                  [facility]

 

       apremilast (OTEZLA) 30 mg Tab        2018 09:25:23        Yes      

            30mg   Q.5D   Take 30 

mg by mouth 2 (two) times daily.                                         [facility]

 

       buPROPion (WELLBUTRIN) 100 MG tablet        2018 09:25:22        Ye

s                  100mg  Q.5D   

Take 100 mg by mouth 2 (two) times daily.                                       

  [facility]

 

                          Duloxetine Hcl (Cymbalta) 30 Mg CAPSULE. Duloxetine 

Hcl (Cymbalta) 30 Mg 

CAPSULE.             Yes               60          Daily             [facility]

 

     Folic Acid Folic Acid           Yes            1         Daily           [facility]

[facility]

 

     Gabapentin Gabapentin           Yes            600       Every 8 Hours     

      [facility]

 

     Levothyroxine Sodium Levothyroxine Sodium           Yes            200     

  Daily@0600           [facility]

 

                          Methotrexate Sodium (Methotrexate) 2.5 Mg TABLET Metho

trexate Sodium 

(Methotrexate) 2.5 Mg TABLET             Yes               20          Wkly     

         [facility]

 

                          Oxybutynin Chloride (Oxybutynin Chloride Er) 15 Mg TAB

.ER.24 Oxybutynin Chloride

(Oxybutynin Chloride Er) 15 Mg TAB.ER.24             Yes               15       

   Daily             [facility]

 

     Tizanidine Hcl Tizanidine Hcl           Yes            4         Bedtime   

        [facility]

 

      Ibuprofen Ibuprofen       2020 00:00:00 No                800       

  Daily as needed for Pain 

                                                    [facility]

 

     Lisinopril Lisinopril      2020 00:00:00 No             10        Twi

ce A Day           [facility]

 

                Meloxicam (Mobic) 15 Mg TABLET Meloxicam (Mobic) 15 Mg TABLET   

              2020 

00:00:00 No                      15              Daily                   [facility]

 

     Bupropion Bupropion      2020 00:00:00 No                       Daily

           [facility]

 

     Folic Acid Folic Acid      2020 00:00:00 No             1         Sara

ly           [facility]

 

                          Hydrocodone Bit/Acetaminophen (Hydrocodon-Acetaminoph 

7.5-325) 1 Each TABLET 

Hydrocodone Bit/Acetaminophen (Hydrocodon-Acetaminoph 7.5-325) 1 Each TABLET    

                       

2020 00:00:00 No                                      As Needed           

      [facility]

 

     Prednisone Prednisone      2020 00:00:00 No             5         Sara

ly           [facility]







Vital Signs





             Vital Name   Observation Time Observation Value Comments     Source

 

             Body Temperature 2020 11:30:00 98.2 [degF]               [facility]

 

             BMI (Body Mass Index) 2020 07:10:00 48.5 kg/m2               

 [facility]

 

             Weight       2020 06:15:00 291.31 [lb_av]              [facility]







Procedures





                Procedure       Date / Time Performed Performing Clinician Beth santiago

 

                Computed tomography of abdomen and pelvis with contrast  00:00:00                 

[facility]

 

                Computed tomography of chest with contrast 2020 00:00:00  

               [facility]

 

                X-ray of chest, two views 2020 00:00:00                 [facility]

[facility]







Plan of Care





             Planned Activity Planned Date Details      Comments     Source

 

             Instructions              Pulmonary Embolism              [facility]







Encounters





             Start Date/Time End Date/Time Encounter Type Admission Type [facility]   Care Department Encounter ID    Source

 

           2020 04:55:00 2020 15:58:00 Discharged Inpatient 1       

   MISAEL SHARMA 

[facility] [id number]        [facility]

 

           2020 13:51:00 2020 13:51:00 Registered Clinic 3          

[facility] [id number]        [facility]







Results





           Test Description Test Time  Test Comments Results    Result Comments 

Source

 

                CHEST SINGLE (PORTABLE) 2020 08:52:00                     

                              

                                                    [place] [address]      Patient Name: PARMJIT YE                                MR
 #: [id number]                  : 1955                                 
  Age/Sex: 65/F  Acct #: [id number]                              Req #: [id number]
[id number]  Adm Physician: MISAEL SHARMA MD                                     
Ordered by: MISAEL SHARMA MD                         Report #: [id number]       
 Location: MED/SURG2                               Room/Bed: 200             
________________________________________________________________________________

___________________    Procedure: [id number] DX/CHEST SINGLE (PORTABLE)  Exam 
Date: 20                            Exam Time: 0832                       
                       REPORT STATUS: Signed    EXAM:  CHEST SINGLE (PORTABLE)  
    DATE: 2020 8:32 AM        INDICATION: Possible pneumonia       
COMPARISON: CT chest with contrast from 2020      FINDINGS:   The trachea 
is midline. The lungs are symmetrically expanded without evidence   for large fo
sarah consolidation, pneumothorax, or significant pleural effusion.      The 
cardiomediastinal is magnified by technique but otherwise unremarkable. The   
pulmonary vasculature is not engorged. No acute osseous abnormality is   
identified. The surrounding soft tissues are unremarkable.         IMPRESSION:  
    No acute cardiopulmonary process identified.      Signed by: Dr. Otis Denise MD on 2020 8:53 AM        Dictated By: OTIS DENISE MD  
Electronically Signed By: OTIS DENISE MD on 20 0853  Transcribed By: 
ONI on 20 0853       COPY TO:   MISAEL SHARMA MD                      

               

 

                    Blood leukocytes automated count (number/volume) 2020 

05:50:00   

 

                                        Test Item

 

             White Blood Count (test code = 6690-2) 13.51        4.8-10.8       

            





[facility] automated count 
(number/volume)2020 05:50:00* 



             Test Item    Value        Reference Range Interpretation Comments

 

             Red Blood Count (test code = 789-8) 3.94         3.6-5.1           

         





[facility] hemoglobin measurement 
(moles/volume)2020 05:50:00* 



             Test Item    Value        Reference Range Interpretation Comments

 

             Hemoglobin (test code = 27642-3) 11.3         12.0-16.0            

      





[facility] blood hematocrit (volume 
fraction)2020 05:50:00* 



             Test Item    Value        Reference Range Interpretation Comments

 

             Hematocrit (test code = 4544-3) 35.7         34.2-44.1             

     





[facility] Dallas[facility] erythrocyte mean corpuscular 
[id number] 05:50:00* 



             Test Item    Value        Reference Range Interpretation Comments

 

             Mean Corpuscular Volume (test code = 787-2) 90.6         81-99     

                 





[facility] erythrocyte mean corpuscular 
hemoglobin (mass per erythrocyte)2020 05:50:00* 



             Test Item    Value        Reference Range Interpretation Comments

 

             Mean Corpuscular Hemoglobin (test code = 785-6) 28.7         28-32 

                     





[facility] Dallas[facility] erythrocyte mean corpuscular 
hemoglobin concentration measurement (mass/volume)2020 05:50:00* 



             Test Item    Value        Reference Range Interpretation Comments

 

             Mean Corpuscular Hemoglobin Concent (test code = 786-4) 31.7       

  31-35                      





[facility] DallasRDW [id number] 05:50:00* 



             Test Item    Value        Reference Range Interpretation Comments

 

             Red Cell Distribution Width (test code = 60166-4) 15.9         11.7

-14.4                  





[facility] Dallas[facility] blood platelet count 
(count/volume)2020 05:50:00* 



             Test Item    Value        Reference Range Interpretation Comments

 

             Platelet Count (test code = 777-3) 339          140-360            

        





[facility] segmented neutrophil 
count as percentage of total [id number] 05:50:00* 



             Test Item    Value        Reference Range Interpretation Comments

 

             Neutrophils (%) (Auto) (test code = 61518-3) 74.7         38.7-80.0

                  





[facility] Dallas[facility] blood lymphocyte count as 
percentage ot total uytmubqbio0646-67-92 05:50:00* 



             Test Item    Value        Reference Range Interpretation Comments

 

             Lymphocytes (%) (Auto) (test code = 736-9) 17.8         18.0-39.1  

                





[facility] DallasAutNov[facility] Centered blood monocyte count as 
percentage of total ostdrmsiry5140-73-62 05:50:00* 



             Test Item    Value        Reference Range Interpretation Comments

 

             Monocytes (%) (Auto) (test code = 5905-5) 7.0          4.4-11.3    

               





[facility] Dallas[facility] blood eosinophil count as 
percentage of total yrzopqwhvi5634-19-41 05:50:00* 



             Test Item    Value        Reference Range Interpretation Comments

 

             Eosinophils (%) (Auto) (test code = 713-8) 0.0          0.0-6.0    

                





[facility] Dallas[facility] blood basophil count as 
percentage of total zypkenfnor5301-52-34 05:50:00* 



             Test Item    Value        Reference Range Interpretation Comments

 

             Basophils (%) (Auto) (test code = 706-2) 0.1          0.0-1.0      

              





[facility] DallasFluoroscopic procedure less than one hour
 [id number] 05:50:00* 



             Test Item    Value        Reference Range Interpretation Comments

 

             IM GRANULOCYTES % (test code = IM GRANULOCYTES %) 0.4          0.0-

1.0                    





[facility] Dallas[facility] blood neutrophil count
2020 05:50:00* 



             Test Item    Value        Reference Range Interpretation Comments

 

             Neutrophils # (Auto) (test code = 751-8) 10.1         2.1-6.9      

              





[facility] Dallas[facility] lymphocytes count (number/volume)
2020 05:50:00* 



             Test Item    Value        Reference Range Interpretation Comments

 

             Lymphocytes # (Auto) (test code = 61709-6) 2.4          1.0-3.2    

                





[facility] Dallas[facility] monocytes automated count 
(number/volume)2020 05:50:00* 



             Test Item    Value        Reference Range Interpretation Comments

 

             Monocytes # (Auto) (test code = 742-7) 0.9          0.2-0.8        

            





[facility] Dallas[facility] blood eosinophil count
2020 05:50:00* 



             Test Item    Value        Reference Range Interpretation Comments

 

             Eosinophils # (Auto) (test code = 711-2) 0.0          0.0-0.4      

              





[facility] Dallas[facility] blood basophil count 
(count/volume)2020 05:50:00* 



             Test Item    Value        Reference Range Interpretation Comments

 

             Basophils # (Auto) (test code = 704-7) 0.0          0.0-0.1        

            





[facility] DallasFluoroscopic procedure less than one hour
 [id number] 05:50:00* 



             Test Item    Value        Reference Range Interpretation Comments

 

                                        Absolute Immature Granulocyte (auto (curry

t code = Absolute Immature Granulocyte 

(auto)          0.06            0-0.1                            





[facility] sodium measurement 
(moles/volume)2020 05:50:00* 



             Test Item    Value        Reference Range Interpretation Comments

 

             Sodium Level (test code = 2951-2) 139          136-145             

       





[facility] Paso[facility]um or plasma potassium measurement 
(moles/volume)2020 05:50:00* 



             Test Item    Value        Reference Range Interpretation Comments

 

             Potassium Level (test code = 2823-3) 4.2          3.5-5.1          

          





[facility] Paso[facility]um or plasma chloride measurement 
(moles/volume)2020 05:50:00* 



             Test Item    Value        Reference Range Interpretation Comments

 

             Chloride Level (test code = 2075-0) 108                      

         





[facility] Paso[facility]um or plasma carbon dioxide, total 
measurement (moles/volume)2020 05:50:00* 



             Test Item    Value        Reference Range Interpretation Comments

 

             Carbon Dioxide Level (test code = 2028-9) 26           22-29       

               





[facility] Paso[facility]um or plasma anion [id number] 
05:50:00* 



             Test Item    Value        Reference Range Interpretation Comments

 

             Anion Gap (test code = 33037-3) 9.2          8-16                  

     





[facility] Paso[facility]um or plasma urea nitrogen measurement
 (mass/volume)2020 05:50:00* 



             Test Item    Value        Reference Range Interpretation Comments

 

             Blood Urea Nitrogen (test code = 3094-0) 22           7-26         

              





[facility] or plasma creatinine measurement 
(mass/volume)2020 05:50:00* 



             Test Item    Value        Reference Range Interpretation Comments

 

             Creatinine (test code = 2160-0) 1.81         0.57-1.11             

     





[facility] Paso[facility]um or plasma urea nitrogen/creatinine 
mass [id number] 05:50:00* 



             Test Item    Value        Reference Range Interpretation Comments

 

             BUN/Creatinine Ratio (test code = 3097-3) 12           6-25        

               





[facility] DallasEstimated glomerular filtration rate 
(GFR) otwebudhqkqtz3359-28-51 05:50:00* 



             Test Item    Value        Reference Range Interpretation Comments

 

             Estimat Glomerular Filtration Rate (test code = 538297592) 28      

     >60                        





Ranges were taken from the National Kidney Disease Education Program and the [employer]
Pending sale to [facility] literature.Reference ranges:60 or greater: Iwwxzn06-31 (
for 3 consecutive months): Chronic kidney disease 15 or less: Kidney failureBaylor Scott & White Heart and Vascular Hospital – DallasGlucose ehzjcjvvhsk5304-70-92 05:50:00* 



             Test Item    Value        Reference Range Interpretation Comments

 

             Glucose Level (test code = TLS3488) 149                      

         





[facility] Paso[facility]um or plasma calcium measurement 
(mass/volume)2020 05:50:00* 



             Test Item    Value        Reference Range Interpretation Comments

 

             Calcium Level (test code = 89677-4) 8.3          8.4-10.2          

         





[facility] Pasoerum or plasma total bilirubin 
measurement (mass/volume)2020 05:50:00* 



             Test Item    Value        Reference Range Interpretation Comments

 

             Total Bilirubin (test code = 1975-2) 0.4          0.2-1.2          

          





[facility] DallasFluoroscopic procedure less than one hour
 [id number] 05:50:00* 



             Test Item    Value        Reference Range Interpretation Comments

 

                                        Aspartate Amino Transf (AST/SGOT) (test 

code = Aspartate Amino Transf 

(AST/SGOT))     18              5-34                             





[facility] Pasoerum or plasma alanine aminotransferase 
measurement (enzymatic activity/volume)2020 05:50:00* 



             Test Item    Value        Reference Range Interpretation Comments

 

             Alanine Aminotransferase (ALT/SGPT) (test code = 1742-6) 10        

   0-55                       





[facility] 
(mass/volume)2020 05:50:00* 



             Test Item    Value        Reference Range Interpretation Comments

 

             Total Protein (test code = 2885-2) 6.5          6.5-8.1            

        





[facility] 
(mass/volume)2020 05:50:00* 



             Test Item    Value        Reference Range Interpretation Comments

 

             Albumin (test code = 1751-7) 2.8          3.5-5.0                  

  





[facility] (mass/volume)
2020 05:50:00* 



             Test Item    Value        Reference Range Interpretation Comments

 

             Globulin (test code = 46552-4) 3.7          2.3-3.5                

    





[facility] albumin/globulin mass 
[id number] 05:50:00* 



             Test Item    Value        Reference Range Interpretation Comments

 

             Albumin/Globulin Ratio (test code = 1759-0) 0.8          0.8-2.0   

                 





[facility] alkaline phosphatase 
measurement (enzymatic activity/volume)2020 05:50:00* 



             Test Item    Value        Reference Range Interpretation Comments

 

             Alkaline Phosphatase (test code = 6768-6) 76                 

               





[facility] creatine kinase 
measurement (enzymatic activity/volume)2020 20:10:00* 



             Test Item    Value        Reference Range Interpretation Comments

 

             Creatine Kinase (test code = 2157-6) 49                      

          





[facility] creatine kinase MB 
measurement (mass/volume)2020 20:10:00* 



             Test Item    Value        Reference Range Interpretation Comments

 

             Creatine Kinase MB (test code = 18435-7) 1.20         0-5.0        

              





[facility] by highly 
sensitive enzyme [id number] 20:10:00* 



             Test Item    Value        Reference Range Interpretation Comments

 

             Troponin I (test code = 17208-8) 0.001        0-0.300              

      





[facility] -31-44 04:48:00            
                                                                          [facility]                        [address] [place] [address]      Patient Name: PARMJIT YE          
                      MR #: [id number]                  : 1955         
                          Age/Sex: 65/F  Acct #: [id number]                   
           Req #: [id number]  Adm Physician:                                    
                  Ordered by: JOYCE MCKEON DO                         Report #:
 [id number]        Location: ER                                      Room/Bed:   
                ___________________________________________________________
________________________________________    Procedure: [id number] CT/CT CHEST W  
Exam Date: 20                            Exam Time: 0320                  
                            REPORT STATUS: Signed    EXAMINATION: CT of the ches
t with contrast, PE protocol.      TECHNIQUE:  Spiral CT images of the chest wer
e performed from the lung apices   through the level of the adrenal glands after
 the IV administration of 150 cc   of Isovue-370.  Thin section reconstructions 
were obtained with special   concentration on the pulmonary arteries.   Coronal 
and sagittal reformatted images were also performed      COMPARISON: <none>     
 CLINICAL HISTORY:Shortness of breath for one week           DISCUSSION:      
Lungs:  Multiple filling defects involving segmental arteries of the right   
upper (superior and posterior segments), right middle (medial and lateral   segm
ents) and right lower lobe (lateral and posterior basal segments).   Lungs are e
ssentially clear. No consolidation, masses or nodules.   Airways are clear, with
out endobronchial lesions.      Pleura:  <There is no evidence of pleural 
effusion or pneumothorax.>      Heart and mediastinum:  Thyroid is unremarkable.
 Heart size is normal. No   pericardial effusion. Aorta is nonaneurysmal. Main 
pulmonary artery measures   2.8 cm, normal.      Lymph nodes: No mediastinal, 
hilar or axillary adenopathy.      Abdomen:  Please see CT abdomen and pelvis 
performed same date for further   detail.      Bones and soft tissues:  No 
aggressive lytic or suspicious focal sclerotic   lesion. Soft tissues are 
grossly unremarkable.      IMPRESSION:       1. Multiple pulmonary emboli 
involving segmental arteries of the right upper,   right middle and right lower 
lobe.      2. Lungs are essentially clear, without consolidation, masses or 
nodules.               Signed by: Dr. Jem Pelaez M.D. on 2020 4:56 
AM        Dictated By: JEM PELAEZ MD  Electronically Signed By: JEM PELAEZ MD on 206  Transcribed By: ONI on 20       COPY
 TO:   JOYCE MCKEON DO         CT ABDOMEN/PELVIS -26-32 04:18:00           
                                                                           [place] [address]      Patient Name: PARMJIT YE          
                      MR #: [id number]                  : 1955         
                          Age/Sex: 65/F  Acct #: [id number]                   
           Req #: [id number]  Adm Physician:                                    
                  Ordered by: JOYCE MCKEON DO                         Report #:
 [id number]        Location: ER                                      Room/Bed:   
                ___________________________________________________________
________________________________________    Procedure: [id number] CT/CT ABDOMEN/P
YARED W  Exam Date: 20                            Exam Time: 0320         
                                     REPORT STATUS: Signed    EXAMINATION: CT of
 the abdomen and pelvis with contrast.      TECHNIQUE:    Spiral CT images of 
e abdomen and pelvis were performed from the lung bases   to the lesser trochant
ers after the intravenous administration of 100 cc of   Isovue 370 and the oral 
administration of water.  Coronal and sagittal   reformatted images were obtaine
d.      COMPARISON:  None.      CLINICAL HISTORY:Left lower quadrant pain for on
e day           DISCUSSION:      ABDOMEN/PELVIS:      LOWER THORAX:Please see CT
 chest performed same day for further details      HEPATOBILIARY: No focal hepat
ic lesions  No intra or extrahepatic biliary   ductal dilation.      GALLBLADDER
: No radio-opaque stones or sludge.  No wall thickening.      SPLEEN: Spleen is 
absent. 2.7 cm splenule      PANCREAS: No focal masses or ductal dilatation. Fat
ty replacement of the   pancreas.      ADRENALS: No adrenal nodules.      KIDNEY
S/URETERS: Symmetrical renal enhancement.   No renal or ureteral calculi.   No h
ydronephrosis, hydroureter or evidence of obstruction.   Well-circumscribed flui
d density lesions in the left renal sinus likely   represent peripelvic cysts.  
 Moderate bilateral perinephric stranding and mild bilateral perinephric fluid. 
  No definite cortical striations are identified.   No solid or cystic lesions. 
     PELVIC ORGANS/BLADDER: Bladder and uterus are unremarkable. No adnexal mass
es.      PERITONEUM/RETROPERITONEUM: No free air or fluid.      LYMPH NODES: No 
intra-abdominal, retroperitoneal, pelvic or inguinal   lymphadenopathy.      VES
SELS: The celiac trunk,superior and inferior mesenteric and bilateral  renal   a
rteries are patent  The portal, superior mesenteric and splenic veins are   erickson
nt.      GI TRACT: Small hiatal hernia. No bowel dilation or evidence of obstruc
tion. No   pericolonic inflammatory changes.      BONES AND SOFT TISSUE: No aggr
essive lytic or suspicious sclerotic lesions.    Multilevel degenerated disks in
 the lower thoracic and to a lesser degree upper   lumbar spine.   Soft tissues 
are grossly unremarkable.      IMPRESSION:       1. Moderate bilateral perinephr
ic stranding and mild bilateral perinephric   fluid. Although no definite cortic
al striations are identified to suggest   pyelonephritis, correlate with urinaly
sis for diarrhea and possibility of   ascending infection.      Signed by: Dr. TI Pelaez M.D. on 2020 4:45 AM        Dictated By: JEM RODRIGUEZ  Electronically Signed By: JEM PELAEZ MD on 20  Transcribed By:
 ONI on 20       COPY TO:   JOYCE MCKEON DO         Urine color 
[id number] 01:10:00* 



             Test Item    Value        Reference Range Interpretation Comments

 

             Urine Color (test code = 5778-6) YELLOW       YELLOW               

      





[facility] Dallas[facility]rine [id number] 01:10:00* 



             Test Item    Value        Reference Range Interpretation Comments

 

             Urine Clarity (test code = 83690-7) CLEAR        CLEAR             

         





[facility]
2020 01:10:00* 



             Test Item    Value        Reference Range Interpretation Comments

 

             Urine Specific Gravity (test code = 5811-5) 1.015        1.010-1.02

5                





[facility] pH measurement by automated test 
[id number] 01:10:00* 



             Test Item    Value        Reference Range Interpretation Comments

 

             Urine pH (test code = 06699-8) 6            5-7                    

    





[facility] leukocyte esterase detection by 
[id number] 01:10:00* 



             Test Item    Value        Reference Range Interpretation Comments

 

             Urine Leukocyte Esterase (test code = 5799-2) NEGATIVE     NEGATIVE

                   





[facility] nitrite [id number] 
01:10:00* 



             Test Item    Value        Reference Range Interpretation Comments

 

             Urine Nitrite (test code = 23248-5) NEGATIVE     NEGATIVE          

         





[facility] protein measurement by test strip 
(mass/volume)2020 01:10:00* 



             Test Item    Value        Reference Range Interpretation Comments

 

             Urine Protein (test code = 5804-0) 1+           NEGATIVE           

        





[facility] glucose [id number] 
01:10:00* 



             Test Item    Value        Reference Range Interpretation Comments

 

             Urine Glucose (UA) (test code = 2349-9) NEGATIVE     NEGATIVE      

             





[facility] ketones detection by automated test
 [id number] 01:10:00* 



             Test Item    Value        Reference Range Interpretation Comments

 

             Urine Ketones (test code = 00187-7) NEGATIVE     NEGATIVE          

         





[facility] urobilinogen measurement by test 
strip (mass/volume)2020 01:10:00* 



             Test Item    Value        Reference Range Interpretation Comments

 

             Urine Urobilinogen (test code = 45863-1) 0.2          0.2-1        

              





[facility] total bilirubin measurement 
(mass/volume)2020 01:10:00* 



             Test Item    Value        Reference Range Interpretation Comments

 

             Urine Bilirubin (test code = 1978-6) NEGATIVE     NEGATIVE         

          





[facility] erythrocytes [id number] 
01:10:00* 



             Test Item    Value        Reference Range Interpretation Comments

 

             Urine Blood (test code = 09079-5) TRACE        NEGATIVE            

       





[facility] DallasAutomated urine sediment leukocyte count 
by microscopy (number/high power field)2020 01:10:00* 



             Test Item    Value        Reference Range Interpretation Comments

 

             Urine WBC (test code = 5821-4) 6-10         0-5                    

    





[facility] DallasErythrocytes detection in urine sediment 
by light [id number] 01:10:00* 



             Test Item    Value        Reference Range Interpretation Comments

 

             Urine RBC (test code = 01401-4) 6-10         0-5                   

     





[facility]acteria detection in urine sediment by 
light [id number] 01:10:00* 



             Test Item    Value        Reference Range Interpretation Comments

 

             Urine Bacteria (test code = 14144-0) FEW          NONE             

          





[facility] DallasEpithelial cells detection in urine 
sediment by light [id number] 01:10:00* 



             Test Item    Value        Reference Range Interpretation Comments

 

             Urine Epithelial Cells (test code = 44377-2) FEW          NONE     

                  





[facility] lipase [facility] 
(enzymatic activity/volume)2020 01:10:00* 



             Test Item    Value        Reference Range Interpretation Comments

 

             Lipase (test code = 3040-3) 37           8-78                      

 





[facility] DallasCHES 2 [id number] 15:59:00         
                                                                             [facility]                        [address] [address]      Patient Name: PARMJIT YE          
                      MR #: [id number]                  : 1955         
                          Age/Sex: 65/F  Acct #: [id number]                   
           Req #: [id number]  Adm Physician:                                    
                  Ordered by: MISAEL SHARMA MD                         Report 
#: [id number]        Location: [facility]                                     Room/Bed: 
                  __________________________________________________________
_________________________________________    Procedure: [id number] DX/CHEST 2 VIE
WS  Exam Date: 20                            Exam Time: 1420              
                                REPORT STATUS: Signed    EXAMINATION:  CHEST 2 V
IEWS          INDICATION: Ptosis      COMPARISON: None           FINDINGS:      
LINES/TUBES:None      LUNGS:The lungs are well-inflated. No focal consolidation 
or pulmonary edema.      PLEURA:No pleural effusion or pneumothorax.      MEDIAS
TINUM:The cardiomediastinal silhouette appears normal in size and shape.      DANIELLA
PHILLIP/SOFT TISSUES:No acute osseous injury.      ABDOMEN:No free air under the hetal
phragm. Surgical clips overlie the upper   abdomen.         IMPRESSION:    No fo
sarah pneumonia or pulmonary edema.      Signed by: Janet Ortiz MD on 2020 3:5
9 PM        Dictated By: JANET ORTIZ MD  Electronically Signed By: JANET ORTIZ MD 
on 20 4098  Transcribed By: ONI on 20 2649       COPY TO:   MISAEL ALEXANDER MD

## 2020-08-01 NOTE — XMS REPORT
Clinical Summary

                             Created on: 2020



Kemi Hutchison

External Reference #: [id number]

: 1955

Sex: Female



Demographics





                          Address                   [address] [place] [address]  [address]

 

                          Home Phone                [phone]

 

                          Preferred Language        English

 

                          Marital Status            Unknown

 

                          Catholic Affiliation     Mormonism

 

                          Race                      White

 

                          Ethnic Group              /Latin





Author





                          Author                    [facility]

 

                          Address                   Unknown

 

                          Phone                     Unavailable







Support





                Name            Relationship    Address         Phone

 

                Parisa Hutchison ECON            Unknown         [phone]







Care Team Providers





                    Care Team Member Name Role                Phone

 

                    Pcp, No             PCP                 Unavailable







Allergies





                                        Comments



                 Active Allergy  Reactions       Severity        Noted Date 

 

                                         



                 Sulfamethoxazole-Trimetho  Anaphylaxis     High            2018 



                                         prim    

 

                                         



                     Infliximab          Hives               2018 







Medications





                          End Date                  Status



              Medication   Sig          Dispensed    Refills      Start  



                                         Date  

 

                                                    Active



                     buPROPion (WELLBUTRIN)  Take 100 mg         0   



                           100 MG tablet             by mouth 2     



                                         (two) times     



                                         daily.     

 

                                                    Active



                     folic acid (FOLVITE) 1 MG  Take 1 mg by        0   



                           tablet                    mouth daily.     

 

                                                    Active



                     HYDROcodone-acetaminophen  Take 1 tablet       0   



                           (NORCO 7.5-325) 7.5-325   by mouth     



                           mg per tablet             every 6 (six)     



                                         hours as     



                                         needed for     



                                         Pain.     

 

                                                    Active



                     ibuprofen (ADVIL,MOTRIN)  Take 600 mg         0   



                           600 MG tablet             by mouth     



                                         every 6 (six)     



                                         hours as     



                                         needed for     



                                         Pain.     

 

                                                    Active



                     levothyroxine (SYNTHROID,  Take 200 mcg        0   



                           LEVOTHROID) 200 MCG       by mouth     



                           tablet                    Every morning     



                                         on an empty     



                                         stomach.     

 

                                                    Active



                     methotrexate 2.5 MG  Take by mouth       0   



                           tablet                    once a week.     

 

                                                    Active



                     omeprazole-sodium   Take 1              0   



                           bicarbonate (ZEGERID)     capsule by     



                           40-1.1 mg-gram per        mouth every     



                           capsule                   morning     



                                         before     



                                         breakfast.     

 

                                                    Active



                     apremilast (OTEZLA) 30 mg  Take 30 mg by       0   



                           Tab                       mouth 2 (two)     



                                         times daily.     

 

                                                    Active



                     oxybutynin (DITROPAN XL)  Take 15 mg by       0   



                           15 MG 24 hr tablet        mouth daily.     

 

                                                    Active



                     predniSONE (DELTASONE) 5  Take 5 mg by        0   



                           MG tablet                 mouth daily.     

 

                                                    Active



                     ADALIMUMAB (HUMIRA SUBQ)  Inject              0   



                                         subcutaneousl     



                                         y.     







Active Problems





Not on file



Social History





                                        Date



                 Tobacco Use     Types           Packs/Day       Years Used 

 

                                         



                                         Never Smoker    

 

    



                                         Smokeless Tobacco: Never   



                                         Used   







   



                 Alcohol Use     Drinks/Week     oz/Week         Comments

 

   



                                         No   







 



                           Sex Assigned at Birth     Date Recorded

 

 



                                         Not on file 







                                        Industry



                           Job Start Date            Occupation 

 

                                        Not on file



                           Not on file               Not on file 







                                        Travel End



                           Travel History            Travel Start 

 





                                         No recent travel history available.







Last Filed Vital Signs

Not on file



Plan of Treatment





Not on file



Results

Not on fileafter 2019

## 2020-08-01 NOTE — EMERGENCY DEPARTMENT NOTE
History of Present Illnes


History of Present Illness


Chief Complaint:  Abdominal Complaints


History of Present Illness


This is a 65 year old  female n/v x 12 today. pt states taking tramadol 

for first time this am and vomiting ever since. pt states no pain in triage just

n/v. pt moaning and loud expiratory. pt aaox4. ambulatory. pt states pain all 

over her body from arthritis.


Historian:  Patient


Arrival Mode:  Car


 Required:  No


Onset (how long ago):  hour(s)


Radiation:  Reports non-radiation


Severity:  moderate


Onset quality:  sudden


Timing of current episode:  intermittent


Progression:  waxing and waning


Chronicity:  new


Context:  Denies recent illness


Relieving factors:  none


Exacerbating factors:  none


Associated symptoms:  Reports denies other symptoms


Treatments prior to arrival:  none





Past Medical/Family History


Physician Review


I have reviewed the patient's past medical and family history.  Any updates have

been documented here.





Past Medical History


Recent Fever:  No


Clinical Suspicion of Infectio:  No


New/Unexplained Change in Ment:  No


Past Medical History:  Hypertension, Hypothyroidism, DVT/PE


Other Medical History:  


psoriatic arthritis





severe morbidity obesity


Past Surgical History:  None


Other Surgery:  


SPLENECTOMY





RIGHT KNEE MENISCUS REPAIR





Social History


Smoking Cessation:  Never Smoker


Alcohol Use:  Occasional


Any Illegal Drug Use:  No





Family History


Family history of heart diseas:  No





Other


Any Pre-Existing Lines (PICC,:  No





Review of Systems


Review of Systems


Constitutional:  Reports no symptoms


EENTM:  Reports no symptoms


Cardiovascular:  Reports no symptoms


Respiratory:  Reports no symptoms


Gastrointestinal:  Reports as per HPI


Genitourinary:  Reports no symptoms


Musculoskeletal:  Reports no symptoms


Integumentary:  Reports no symptoms


Neurological:  Reports no symptoms


Psychological:  Reports no symptoms


Endocrine:  Reports no symptoms


Hematological/Lymphatic:  Reports no symptoms





Physical Exam


Related Data


Allergies:  


Coded Allergies:  


     infliximab (Verified  Allergy, Unknown, HIVES, 9/19/17)


     sulfamethoxazole (Verified  Allergy, Unknown, TONGUE SWELLED, 9/19/17)


     trimethoprim (Verified  Allergy, Unknown, TONGUE SWELLED, 9/19/17)


Triage Vital Signs





Vital Signs








  Date Time  Temp Pulse Resp B/P (MAP) Pulse Ox O2 Delivery O2 Flow Rate FiO2


 


8/1/20 17:50 97.0 75 22 151/64 97 Room Air  








Vital signs reviewed:  Yes





Physical Exam


CONSTITUTIONAL





Constitutional:  Present well-developed, Present well-nourished


HENT


HENT:  Present normocephalic, Present atraumatic, Present oropharynx 

clear/moist, Present nose normal


HENT L/R:  Present left ext ear normal, Present right ext ear normal


EYES





Eyes:  Reports PERRL, Reports conjunctivae normal


NECK


Neck:  Present ROM normal


PULMONARY


Pulmonary:  Present effort normal, Present breath sounds normal


CARDIOVASCULAR





Cardiovascular:  Present regular rhythm, Present heart sounds normal, Present 

capillary refill normal, Present normal rate


GASTROINTESTINAL





Abdominal:  Present soft, Present nontender, Present bowel sounds normal


GENITOURINARY





Genitourinary:  Present exam deferred


SKIN


Skin:  Present warm, Present dry


MUSCULOSKELETAL





Musculoskeletal:  Present ROM normal


NEUROLOGICAL





Neurological:  Present alert, Present oriented x 3, Present no gross motor or 

sensory deficits


PSYCHOLOGICAL


Psychological:  Present mood/affect normal, Present judgement normal





Results


Laboratory


Result Diagram:  


8/1/20 1800                                                                     

          8/1/20 1800





Laboratory





Laboratory Tests








Test


 8/1/20


18:00


 


White Blood Count


 8.22 x10e3/uL


(4.8-10.8)


 


Red Blood Count


 4.63 x10e6/uL


(3.6-5.1)


 


Hemoglobin


 12.9 g/dL


(12.0-16.0)


 


Hematocrit


 39.6 %


(34.2-44.1)


 


Mean Corpuscular Volume


 85.5 fL


(81-99)


 


Mean Corpuscular Hemoglobin


 27.9 pg


(28-32)


 


Mean Corpuscular Hemoglobin


Concent 32.6 g/dL


(31-35)


 


Red Cell Distribution Width


 15.8 %


(11.7-14.4)


 


Platelet Count


 397 x10e3/uL


(140-360)


 


Neutrophils (%) (Auto)


 52.2 %


(38.7-80.0)


 


Lymphocytes (%) (Auto)


 35.6 %


(18.0-39.1)


 


Monocytes (%) (Auto)


 8.9  %


(4.4-11.3)


 


Eosinophils (%) (Auto)


 2.2 %


(0.0-6.0)


 


Basophils (%) (Auto)


 0.7 %


(0.0-1.0)


 


Neutrophils # (Auto) 4.3 (2.1-6.9) 


 


Lymphocytes # (Auto) 2.9 (1.0-3.2) 


 


Monocytes # (Auto) 0.7 (0.2-0.8) 


 


Eosinophils # (Auto) 0.2 (0.0-0.4) 


 


Basophils # (Auto) 0.1 (0.0-0.1) 


 


Absolute Immature Granulocyte


(auto 0.03 x10e3/uL


(0-0.1)


 


Prothrombin Time


 13.9 seconds


(11.9-14.5)


 


Prothromb Time International


Ratio 1.02 





 


Activated Partial


Thromboplast Time 34.3 seconds


(23.8-35.5)


 


Sodium Level


 139 mmol/L


(136-145)


 


Potassium Level


 3.6 mmol/L


(3.5-5.1)


 


Chloride Level


 105 mmol/L


()


 


Carbon Dioxide Level


 24 mmol/L


(22-29)


 


Anion Gap


 13.6 mmol/L


(8-16)


 


Blood Urea Nitrogen 9 mg/dL (7-26) 


 


Creatinine


 0.69 mg/dL


(0.57-1.11)


 


Estimat Glomerular Filtration


Rate > 60 ML/MIN


(60-)


 


BUN/Creatinine Ratio 13 (6-25) 


 


Glucose Level


 121 mg/dL


()


 


Calcium Level


 9.2 mg/dL


(8.4-10.2)


 


Total Bilirubin


 0.5 mg/dL


(0.2-1.2)


 


Aspartate Amino Transf


(AST/SGOT) 13 IU/L (5-34) 





 


Alanine Aminotransferase


(ALT/SGPT) 10 IU/L (0-55) 





 


Alkaline Phosphatase


 89 IU/L


()


 


Creatine Kinase


 61 IU/L


()


 


Creatine Kinase MB


 1.30 ng/mL


(0-5.0)


 


Troponin I


 < 0.001 ng/mL


(0-0.300)


 


Total Protein


 7.5 g/dL


(6.5-8.1)


 


Albumin


 3.3 g/dL


(3.5-5.0)


 


Globulin


 4.2 g/dL


(2.3-3.5)


 


Albumin/Globulin Ratio 0.8 (0.8-2.0) 


 


Lipase 9 U/L (8-78) 








Lab results reviewed:  Yes





Assessment & Plan


Medical Decision Making


MDM


check cbc, chem, cardiacs, place iv and give IVF's and Zofran





Reassessment


Reassessment


pt feels much better - DC Tramadol, will give Tyl#3, Zofran ODT, F/U PCP [facility]

Monday





Assessment & Plan


Final Impression:  


(1) Vomiting


(2) Medication side effect


(3) Psoriatic arthritis


Depart Disposition:  HOME, SELF-CARE


Last Vital Signs











  Date Time  Temp Pulse Resp B/P (MAP) Pulse Ox O2 Delivery O2 Flow Rate FiO2


 


8/1/20 18:53  68 14 144/74 100 Room Air  


 


8/1/20 17:50 97.0       








Home Meds


Reported Medications


Duloxetine Hcl (CYMBALTA) 30 Mg Capsule.dr, 60 MG PO DAILY, #30 CAP


   7/16/20


Gabapentin (GABAPENTIN) 300 Mg Capsule, 600 MG PO Q8H, #60 CAP


   7/16/20


Tizanidine Hcl (TIZANIDINE HCL) 4 Mg Capsule, 4 MG PO HS


   7/16/20


Folic Acid (FOLIC ACID) 0.8 Mg Tablet, 1 MG PO DAILY


   7/16/20


Methotrexate Sodium (METHOTREXATE) 2.5 Mg Tablet, 20 MG PO WKLY, #30 TAB


   WEEKLY ON SUNDAYS


   9/19/17


Levothyroxine Sodium (LEVOTHYROXINE SODIUM) 200 Mcg Tablet, 200 MCG PO 

DAILY@0600


   9/19/17


Oxybutynin Chloride (OXYBUTYNIN CHLORIDE ER) 15 Mg Tab.er.24, 15 MG PO DAILY


   9/19/17


Medications in the ED





Ondansetron HCl 8 mg NOW  ONCE IV ;  Start 8/1/20 at 18:30;  Stop 8/1/20 at 

18:01;  Status DC


Pantoprazole Sodium 40 mg ONCE  ONCE IV  Last administered on 8/1/20at 18:37; 

Admin Dose 40 MG;  Start 8/1/20 at 19:00;  Stop 8/1/20 at 19:01;  Status DC


Ondansetron HCl 8 mg ONCE  ONCE IV  Last administered on 8/1/20at 18:18; Admin 

Dose 8 MG;  Start 8/1/20 at 18:30;  Stop 8/1/20 at 18:31;  Status DC


Sodium Chloride 1,000 ml @  0 mls/hr Q0M STAT IV  Last administered on 8/1/20at 

18:15; Admin Dose 1,000 MLS/HR;  Start 8/1/20 at 18:01;  Stop 8/1/20 at 18:04;  

Status DC


Ondansetron HCl 8 mg STK-MED ONCE .ROUTE ;  Start 8/1/20 at 18:23;  Stop 8/1/20 

at 18:17;  Status DC











CARRIE BARNES MD                Aug 1, 2020 19:19

## 2020-08-01 NOTE — XMS REPORT
Continuity of Care Document

                             Created on: 2020



PARMJIT YE

External Reference #: [id number]

: 1955

Sex: Female



Demographics





                          Address                   [address] [place] [address]

 

                          Home Phone                [phone]

 

                          Preferred Language        English

 

                          Marital Status            Unknown

 

                          Latter day Affiliation     Unknown

 

                          Race                      Unknown

 

                          Ethnic Group              Unknown





Author





                          Author                    [facility]

 [facility]

 

                          Organization              [facility]

 [facility]

 

                          Address                   Unknown

 

                          Phone                     Unavailable







Care Team Providers





                    Care Team Member Name Role                Phone

 

                    [employer] [facility] Exchange Unavailable         Un

available



                                    



Problems

                    



                    Problem                         Status                      

   Onset Date       

                          Classification                         Date Reported  

         

                          Comments                         Source               

     

 

                    Lumbago                         Active                      

                    

                    Diagnosis                         04/10/2020                

             

                                        [facility]                    

 

                          Allergic reaction caused by a drug                    

     Active               

                                             Problem                         2020    

                                                    [facility]              

      

 

                          Long-term use of high-risk medication                 

        Active            

                                             Problem                         2020 

                                                    [facility]              

    

 

 

                          Primary osteoarthritis involving multiple joints      

                   Active 

                                                Problem                         

2020                                                   [facility]     

 

             

 

                    Other psoriasis                         Active              

                    

                          Diagnosis                         2020          

           

                                                    [facility]              

      

 

                          Body mass index (BMI) 45.0-49.9, adult                

         Active           

                                             Problem                         2020

                                                    [facility]              

   

  

 

                    Ankle pain, right                         Active            

                    

                          Problem                         2020            

         

                                                    [facility]              

      

 

                    Psoriatic arthritis                         Active          

                    

                          Diagnosis                         2020          

       

                                                    [facility]              

      

 

                          Long term (current) use of opiate analgesic           

              Active      

                                             Problem                         

2020                                                   [facility]     

 

             

 

                    Chronic fatigue                         Active              

                    

                          Problem                         2020            

           

                                                    [facility]              

      

 

                    Obesity, unspecified                         Active         

                    

                          Problem                         2016            

      

                                                    [facility]              

      

 

                    Pain, back                         Active                   

                    

                    Problem                         2016                  

          

                                        [facility]                    

 

                    Psoriasis                         Active                    

                    

                    Problem                         2016                  

           

                                        [facility]                    

 

                    Psoriatic arthritis                         Active          

                    

                          Problem                         2016            

       

                                                    [facility]              

      

 

                          Postmenopausal status (age-related)                   

      Active              

                                             Problem                         2016   

                                                    [facility]              

      

 

                                        Encounter for long-term (current) use of

 other high-risk medications            

                    Active                                                  Diag

nosis   

                          2017                                            

     

                                        [facility]                    

 

                          Osteoarthrosis generalized, unspecified site          

               Active     

                                             Problem                         

2016                                                   [facility]     

 

             

 

                          Osteoarthrosis, lower leg                         Acti

ve                        

                                             Problem                         2016             

                                                    [facility]              

      

 

                          Screening for osteoporosis                         Act

lela                       

                                             Diagnosis                         0

2018          

                                                    [facility]              

      

 

                          Unspecified inflammatory polyarthropathy              

           Active         

                                             Problem                         

2015                                                   [facility]     

 

             

 

                          Morbid (severe) obesity due to excess calories        

                 Active   

                                              Problem                         

2020                                                   [facility]     

 

             

 

                    Vitamin D deficiency                         Active         

                    

                          Problem                         2020            

      

                                                    [facility]              

      

 

                    Autoimmune thyroiditis                         Active       

                    

                          Problem                         2020            

    

                                                    [facility]              

      

 

                    Vitamin B12 deficiency                         Active       

                    

                          Diagnosis                         2018          

    

                                                    [facility]              

      

 

                    Cough                         Active                        

                    

                    Diagnosis                         2020                

               

                                        [facility]                    

 

                    Other chronic pain                         Active           

                    

                          Problem                         2020            

        

                                                    [facility]              

      



                                                                                
                                                                                
                                                                                
                                                                                
                                                                                
                                                                   



Medications

                    



                    Medication                         Details                  

       Route        

                          Status                         Patient Instructions   

          

                          Ordering Provider                         Order Date  

               

                                        Source                    

 

                          Clobetasol Propionate                         1 applic

ation to affected area    

                          Externally                         Active             

      

                          0.05 % Externally Twice a day                         

[facility]               

                          2019                         [facility]     

               

 

                          Taltz                         160 mg (2 x 80mg) week 0

 then 80 mg week 2 4 6 8 

10 and 12 then 80 mg every 4 weeks thereafter                         

Subcutaneous                         Active                         80 MG/ML 

Subcutaneous as directed                         [facility]                         

10/15/2019                              [facility]                    

 

                          Prednisone Taper                         3 tablets for

 5 days, 2 tablets for 5 

days and then 1 tablet for 5 days                         oral                  

                          Active                         5mg oral 1 tab tidx5d, 

1 tab bidx5d, 1tab 

qdx5d                         [facility]                         2019           

                                        [facility]                    

 

                    Motrin                         1 tablet                     

    Orally          

                          Active                         600 MG Orally Three griselda

es a day    

                          [facility]                         2019               

      

                                        [facility]                    

 

                          Taltz                         160 mg (2 x 80mg) week 0

 then 80 mg week 2 4 6 8 

10 and 12 then 80 mg every 4 weeks thereafter                         

Subcutaneous                         Active                         80 MG/ML 

Subcutaneous as directed                         [facility]                         

2019                              [facility]                    

 

                          Taltz                         160 mg (2 x 80mg) week 0

 then 80 mg week 2 4 6 8 

10 and 12 then 80 mg every 4 weeks thereafter                         

Subcutaneous                         Active                         80 MG/ML 

Subcutaneous as directed                         [facility]                        

                          2019                         [facility]     

               

 

                          Diclofenac Sodium                         2 pumps to a

ffected area              

                    Transdermal                         Active                  

       1 %

Transdermal Three times a day                         [facility]                      

                          2019                         [facility]     

               

 

                          Pennsaid                         2 pumps to affected a

kenneth                       

                    Transdermal                         Active                  

       2 % 

Transdermal Twice a day                         [facility]                         

2019                              [facility]                    

 

                    Methotrexate Sodium                         0.8 cc          

               SC 

Injection                         Active                         25 MG/ML SC 

Injection once a week                         [facility]                         

2019                              [facility]                    

 

                          Pennsaid                         2 pumps to affected a

kenneth                       

                    Transdermal                         Active                  

       2 % 

Transdermal Twice a day                         [facility]                         

2019                              [facility]                    

 

                    Cosentyx Sensoready Pen                         1 ml        

                 

Subcutaneous                         Active                         150 MG/ML 

Subcutaneous 2 injections once a week x5, then every 4 weeks                    
                    [facility]                         2018                  

       [facility]                    

 

                          Sure Comfort Insulin Syringe                         a

s directed                

                    subcutaneously                         Active               

          

28G X 1/2 subcutaneously Once a week wiht Methotrexate                         

[facility]                         2018                         [facility]  

                 

 

                          Hydrocodone-Acetaminophen                         1 ta

blet as needed            

                    Orally                         Active                       

  7.5-

325 MG Orally qid                         [facility]                         

2018                              [facility]                    

 

                    Methotrexate Sodium                         0.8 cc          

               SC 

Injection                         Active                         25 MG/ML SC 

Injection once a week                         [facility]                         

2018                              [facility]                    

 

                          Hydrocodone-Acetaminophen                         1 ta

blet as needed            

                    Orally                         Active                       

  7.5-

325 MG Orally qid                         [facility]                         

2018                              [facility]                    

 

                          Hydrocodone-Acetaminophen                         1 ta

blet as needed            

                    Orally                         Active                       

  7.5-

325 MG Orally qid                         [facility]                         

2018                              [facility]                    

 

                    Humira Pen                         0.8 ml                   

      Subcutaneous  

                          Active                         40 MG/0.8ML Subcutaneou

s 

Every two weeks                         [facility]                         

2018                              [facility]                    

 

                    Stelara                         1 injection                 

        Subcutaneous

                          Active                         90 mg Subcutaneous ever

y 

12 weeks                         [facility]                         2018     

                                        [facility]                    

 

                          Hydrocodone-Acetaminophen                         1 ta

blet as needed            

                    Orally                         Active                       

  7.5-

325 MG Orally qid                         [facility]                         

2018                              [facility]                    

 

                    Otezla                         1 tablet                     

    Orally          

                          Active                         30 MG Orally Twice a da

y           

                    [facility]                         2018                  

       

[facility]                    

 

                    Otezla                         1 tablet                     

    Orally          

                          Active                         30 MG Orally Twice a da

y           

                    [facility]                         2018                  

       

[facility]                    

 

                          Pennsaid                         apply 40mg(2 pump act

uations) to affected area 

twice daily                         Transdermal                         Active  

                          2 % Transdermal Twice a day                         

[facility]                         2018                         [facility]                    

 

                    Methotrexate                         8 tablets              

           Orally   

                          Active                         2.5mg Orally Once a wee

k    

                          [facility]                         2018            

      

                                        [facility]                    

 

                          Hydrocodone-Acetaminophen                         1 ta

blet as needed            

                    Orally                         Active                       

  7.5-

325 MG Orally tid                         [facility]                         

2017                              [facility]                    

 

                          Hydrocodone-Acetaminophen                         1 ta

blet as needed            

                    Orally                         Active                       

  7.5-

325 MG Orally tid                         [facility]                         

2017                              [facility]                    

 

                          Pennsaid                         apply 40mg(2 pump act

uations) to affected area 

twice daily                         Transdermal                         Active  

                          2 % Transdermal Twice a day                         [facility]

                          10/11/2017                         [facility]     

  

            

 

                          Voltaren Gel                         apply 2g to 4g to

 affected area            

                    Transdermal                         Active                  

       

1% Transdermal Four times a day                         [facility]                 

                          10/11/2017                         [facility]     

               

 

                          Hydrocodone-Acetaminophen                         1 ta

blet as needed            

                    Orally                         Active                       

  7.5-

325 MG Orally tid                         [facility]                         

2017                              [facility]                    

 

                          Stelara                         at 0, 4wks and then ev

patricia 12wks                 

                    Subcutaneous                         Active                 

        90 

MG/ML Subcutaneous                         [facility]                         

2017                              [facility]                    

 

                          Hydrocodone-Acetaminophen                         1 ta

blet as needed            

                    Orally                         Active                       

  7.5-

325 MG Orally tid                         [facility]                         

2017                              [facility]                    

 

                    Remicade                         7mg/kg                     

    Intravenous     

                          Active                         100 MG Intravenous     

       

                    [facility]                         2017                   

      

[facility]                    

 

                    Medrol Dose Kiran                         as directed         

                

Orally                         Active                         4mg Orally once a 

day                         [facility]                         2017           

                                        [facility]                    

 

                    PredniSONE                         1 tablet                 

        Orally      

                          Active                         5 MG Orally Once a day 

        

                          [facility]                         2017             

           

                                        [facility]                    

 

                          Clobetasol Propionate                         1 applic

ation to affected area    

                          Externally                         Active             

      

                          0.05 % Externally Twice a day                         

[facility]                         [facility]     

               

 

                          Clobetasol Propionate                         1 applic

ation to affected area    

                          Externally                         Active             

      

                          0.05 % Externally Twice a day                         

[facility]               

                          12/15/2016                         [facility]     

               

 

                          Hydrocodone-Acetaminophen                         1 ta

blet as needed            

                    Orally                         Active                       

  7.5-

325 MG Orally every 8 hrs                         [facility]                       

                          2016                         [facility]     

               

 

                    Cyanocobalamin                         1 ml                 

        Injection   

                          Active                         1000 MCG/ML Injection e

very 

2 weeks                         [facility]                         10/24/2016       

                                        [facility]                    

 

                    Methotrexate                         8 tablets              

           Orally   

                          Active                         2.5mg Orally Once a wee

k    

                          [facility]                         10/24/2016             

      

                                        [facility]                    

 

                          PredniSONE                         TAKE 1 TABLET ONCE 

A DAY                     

                    Orally                         Active                       

  5 MG Orally 

Once a day                         [facility]                         10/17/2016    

                                        [facility]                    

 

                    Methotrexate                         8 tablets              

           Orally   

                          Active                         2.5mg Orally Once a wee

k    

                          [place]                         2016            

      

                                        [facility]                    

 

                    Folic Acid                         1 tablet                 

        Orally      

                          Active                         1 MG Orally twice a day

        

                          [place]                         2016            

          

                                        [facility]                    

 

                          Hydrocodone-Acetaminophen                         1 ta

blet as needed            

                    Orally                         Active                       

  7.5-

325 MG Orally every 8 hrs                         [place]                       

                          07/10/2016                         Gen [facility]     

               

 

                          Hydrocodone-Acetaminophen                         1 ta

blet as needed            

                    Orally                         Active                       

  7.5-

325 MG Orally every 6 hrs                         [place]                       

                          2016                         [facility]     

               

 

                          Hydrocodone-Acetaminophen                         1 ta

blet as needed            

                    Orally                         Active                       

  7.5-

325 MG Orally TID                         [place]                         

2016                              [facility]                    

 

                          Pennsaid                         2 applications to aff

ected area                

                    Transdermal                         Active                  

       2 % 

Transdermal Twice a day                         [place]                         

03/10/2016                              [facility]                    

 

                    Pennsaid                         40 ggts                    

     topical        

                          No Longer Active                         1.5% topical 

qid       

                          [facility]                         03/10/2016             

         

                                        [facility]                    

 

                          Hydrocodone-Acetaminophen                         1 ta

blet as needed            

                    Orally                         Active                       

  5-325 

MG Orally every 6 hrs                         [facility]                         

2016                              [facility]                    

 

                    Otezla                         1 tablet                     

    Orally          

                          Active                         30 MG Orally Twice a da

y           

                    [facility]                         2016                   

      

[facility]                    

 

                    Otezla                         as directed                  

       Orally       

                          Active                         10 & 20 & 30 MG Orally 

         

                    [facility]                         2016                   

      

[facility]                    

 

                    Enbrel SureClick                         1 ml               

          

Subcutaneous                         No Longer Active                         50

MG/ML Subcutaneous every week                         [facility]                    

                          10/29/2015                         Gen [facility]     

               

 

                          Pennsaid                         2 applications to aff

ected area                

                    Transdermal                         Active                  

       2 % 

Transdermal Twice a day                         [facility]                         

2015                              [facility]                    

 

                    PredniSONE                         as directed              

           Orally   

                          Active                         5 MG Orally Once a day 

     

                          [facility]                         2015             

        

                                        [facility]                    

 

                    Folic Acid                         1 tablet                 

        Orally      

                          Active                         1 MG Orally Twice a day

        

                          [place]                         2015            

          

                                        [facility]                    

 

                    Methotrexate                         4 tablets              

           Orally   

                          Active                         2.5mg Orally Once a wee

k    

                          [place]                         2015            

      

                                        [facility]                    

 

                          Plaquenil                         1 tablet with food o

r milk                    

                    Orally                         No Longer Active             

            200 

MG Orally Twice a day                         [facility]                         

2015                              [facility]                    

 

                    Folic Acid                         1 tablet                 

        Orally      

                          Active                         1 MG Orally twice a day

        

                    [place]                                                  Tricia [facility]                    

 

                          Levothyroxine Sodium                         1 tablet 

on an empty stomach in the

morning                         Orally                         Active           

                          200mg Orally Once a day                         [place]

e            

                                                    [facility]              

      

 

                          Clobetasol Propionate                         1 applic

ation to affected area    

                          Externally                         Active             

      

                          0.05 % Externally Twice a day                         

[facility]              

                                                    [facility]              

      

 

                    Oxybutynin Chloride                         as directed     

                    

Orally                         Active                         15 MG Orally      

                    [facility]                                                  

[facility]                    

 

                    Motrin                         1 tablet                     

    Orally          

                          Active                         600 MG Orally Three griselda

es a day    

                    [facility]                                                  

[facility]                    

 

                    PredniSONE                         1 tablet                 

        Orally      

                          Active                         5 MG Orally Once a day 

        

                    [facility]                                                  Phi

mitali [facility]                    

 

                    Methotrexate                         8 tablets              

           Orally   

                          Active                         2.5mg Orally Once a wee

k    

                    [place]                                                  

[facility]                    

 

                          Hydrocodone-Acetaminophen                         1 ta

blet as needed            

                    Orally                         Active                       

  7.5-

325 MG Orally tid                         [place]                                

                                        [facility]                    

 

                          Pennsaid                         2 applications to aff

ected area                

                    Transdermal                         Active                  

       2 % 

Transdermal Twice a day                         [place]                          

                                        [facility]                    

 

                          Levothyroxine Sodium                         1 tablet 

on an empty stomach in the

morning                         Orally                         Active           

                          200mg Orally Once a day                         [facility]

             

                                                    [facility]              

      

 

                    Stelara                         1 injection                 

        Subcutaneous

                          Active                         90 mg Subcutaneous ever

y 

12 weeks                         Omi [facility]                    

 

                          Clobetasol Propionate                         1 applic

ation to affected area    

                          Externally                         Active             

      

                          0.05 % Externally Twice a day                         

Yassine [facility]              

      

 

                          Hydrocodone-Acetaminophen                         1 ta

blet as needed            

                    Orally                         Active                       

  7.5-

325 MG Orally qid                         Yassine [facility]                    

 

                    PredniSONE                         1 tablet                 

        Orally      

                          Active                         5 MG Orally Once a day 

        

                    [facility]ass[place]                                                  Tricia [facility]                    

 

                    BuPROPion HCl                         1 tablet              

           Orally   

                          Active                         75 MG Orally Once a day

     

                    Yassine                                                  Tricia [facility]                    

 

                    Nabumetone                         1 tablet                 

        Orally      

                          Active                         500 MG Orally Twice a d

ay      

                    [facility]                                                  

[facility]                    

 

                    Otezla                         1 tablet                     

    Orally          

                          Active                         30 MG Orally Twice a da

y           

                    [facility]                    

 

                    Oxybutynin Chloride                         as directed     

                    

Orally                         Active                         15 MG Orally once 

a day                         Ellsworthyuri [facility]                    

 

                          Voltaren Gel                         apply 2g to 4g to

 affected area            

                    Transdermal                         Active                  

       

1% Transdermal Four times a day                         Manjeet [facility]              

      

 

                    Folic Acid                         1 tablet                 

        Orally      

                          Active                         1 MG Orally twice a day

        

                    Manjeet [facility]                    

 

                          Hydrocodone-Acetaminophen                         1 ta

blet as needed            

                    Orally                         Active                       

  5-325 

MG Orally every 6 hrs                         [facility]                            

                                        [facility]                    

 

                    Enbrel SureClick                         1 ml               

          

Subcutaneous                         Active                         50 MG/ML 

Subcutaneous                         [facility]                                     

                                        [facility]                    

 

                    Methotrexate                         8 tablets              

           Orally   

                          Active                         2.5mg Orally Once a wee

k    

                    [facility]                                                  

[facility]                    

 

                          PredniSONE                         TAKE 1 TABLET ONCE 

A DAY                     

                    NA                         Active                         5 

MG               

                    Manjeet [facility]

                   

 

                    Pennsaid                         40 ggts                    

     topical        

                          Active                         1.5% topical qid       

          

                    [place]                                                  César [facility]  

                 

 

                    Remicade                         as directed                

         Intravenous

                          Active                         100 MG Intravenous     

  

                    [facility]                                                  Anselmo [facility]                    

 

                    Cyanocobalamin                         1 ml                 

        Injection   

                          Active                         1000 MCG/ML Injection e

very 

2 weeks                         Fililisa [facility]                    

 

                    Methocarbamol                         1 tablet              

           Orally   

                          Active                         750 MG Orally every 4 h

rs   

                     [facility]                                                  

[facility]                    

 

                          Triamterene-HCTZ                         1 tablet in t

he morning                

                    Orally                         Active                       

  37.5-25 MG

Orally Once a day                         [facility]                                

                                        [facility]                    

 

                    Motrin                         1 tablet                     

    Orally          

                          Active                         600 MG Orally Three griselda

es a day    

                    [facility]                    

 

                          Taltz                         160 mg (2 x 80mg) week 0

 then 80 mg week 2 4 6 8 

10 and 12 then 80 mg every 4 weeks thereafter                         

Subcutaneous                         Active                         80 MG/ML 

Subcutaneous as directed                         Yassine [facility]                    

 

                          Ibuprofen                         1 tablet with food o

r milk as needed          

                    Orally                         Active                       

  600 

MG Orally Three times a day                         Yassine [facility]              

      

 

                    Methotrexate Sodium                         0.8 cc          

               SC 

Injection                         Active                         25 MG/ML SC 

Injection once a week                         Yassine [facility]                    

 

                          Cosentyx Sensoready 300 Dose                         1

 ml                       

                    Subcutaneous                         Active                 

        150 MG/ML 

Subcutaneous 2 injections every 4 weeks                         Manjeet [facility]              

      

 

                          Levothyroxine Sodium                         1 tablet 

on an empty stomach in the

morning                         Orally                         Active           

                          200mg Orally Once a day                         Yassine [facility]              

      

 

                          Pennsaid                         apply 40mg(2 pump act

uations) to affected area 

twice daily                         Transdermal                         Active  

                          2 % Transdermal Twice a day                         

Manjeet [facility]        

 

          

 

                    Methotrexate Sodium                         0.8 cc          

               SC 

Injection                         Active                         25 MG/ML SC 

Injection once a week                         Tello [facility]                    

 

                          Levothyroxine Sodium                         1 tablet 

on an empty stomach in the

morning                         Orally                         Active           

                          200mg Orally Once a day                         Tello [facility]              

      

 

                    Oxybutynin Chloride                         as directed     

                    

Orally                         Active                         15 MG Orally once 

a day                         [facility]                                            

                                        [facility]                    

 

                    Oxybutynin Chloride                         as directed     

                    

Orally                         Active                         15 MG Orally once 

a day                         [facility]                                              

                                        [facility]                    



                                                                                
                                                                                
                                                                                
                                                                                
                                                                                
                                                                                
                                                                                
                                                                                
                                                                                
                                                                                
                                                                                
                                                                                
                                                                                
                                                                                
                                                                                
                                                                                
                                                                                
                                                                                
                                                                                
                                                 



Allergies, Adverse Reactions, Alerts

                    



                    Substance                         Category                  

       Reaction     

                          Severity                         Reaction type        

       

                    Status                         Date Reported                

         

Comments                                Source                    

 

                    Remicade                         Adverse Reaction           

              Info 

Not Available                                                   Adverse Reaction

 

                          Active                         2019             

   

                                                    [facility]              

      

 

                    Plaquenil                         Adverse Reaction          

               GI 

upset, dizziness, HA                                                   Adverse 

Reaction                         Active                         2019      

                                                    [facility]              

      

 

                    Bactrim                         Adverse Reaction            

             facial 

and tongue swelling                                                   Adverse 

Reaction                         Active                         2019      

                                                    [facility]              

      

 

                    Otezla                         Adverse Reaction             

            Info Not

Available                                                   Adverse Reaction    

 

                          Active                         2019             

       

                                                    [facility]              

      

 

                    Enbrel                         Adverse Reaction             

            frequent

infections                                                   Adverse Reaction   

 

                          Active                         2019             

      

                                                    [facility]              

      

 

                    Stelara                         Adverse Reaction            

             Info 

Not Available                                                   Adverse Reaction

 

                          Active                         2019             

   

                                                    [facility]              

      

 

                    Humira                         Adverse Reaction             

            Info Not

Available                                                   Adverse Reaction    

 

                          Active                         2019             

       

                                                    [facility]              

      



                                                                                
                       



Immunizations

                    



                    Immunization                         Date Given             

            Site    

                          Status                         Last Updated           

      

                          Comments                         Source               

     

 

                    Vitamin B12                         2016              

                    

                    completed                                                   

     

                                        [facility]                    

 

                    Depomedrol                         2016               

                    

                    completed                                                   

      

                                        [facility]                    



                                                             



Results





                                        No Data Provided for This Section



                    



Pathology Reports





                                        No Data Provided for This Section       

             



                            



Diagnostic Reports

            



                                        No Data Provided for This Section       

             



                                                            



Consultation Notes

                    



                                        No Data Provided for This Section       

             



                                                            



Discharge Summaries

                    



                                        No Data Provided for This Section       

             



                                                            



History and Physicals

                    



                                        No Data Provided for This Section       

             



                                                                



Vital Signs

                     



                    Vital Sign                         Value                    

     Date           

                          Comments                         Source               

     

 

                    Weight                         272.4                        

  2019        

                                                    [facility]              

      

 

                    Height                         62                          1

2019           

                                                    [facility]              

      

 

                    Temperature Oral (F)                         98.0 F         

                

2019                                                   [facility]     

 

             

 

                    Heart Rate                         76                       

   2019       

                                                    [facility]              

      

 

                    Diastolic (mm Hg)                         76                

          2019

                                                    [facility]              

   

  

 

                    Systolic (mm Hg)                         110                

          2019

                                                    [facility]              

   

  

 

                    Weight                         280                          

2019          

                                                    [facility]              

      

 

                    Height                         62                          1

2019           

                                                    [facility]              

      

 

                    Temperature Oral (F)                         99.1 F         

                

2019                                                   [facility]     

 

             

 

                    Heart Rate                         76                       

   2019       

                                                    [facility]              

      

 

                    Diastolic (mm Hg)                         80                

          2019

                                                    [facility]              

   

  

 

                    Systolic (mm Hg)                         142                

          2019

                                                    [facility]              

   

  

 

                    Weight                         278.4                        

  10/17/2019        

                                                    [facility]              

      

 

                    Height                         61                          1

           

                                                    [facility]              

      

 

                    Temperature Oral (F)                         98.1 F         

                

10/17/2019                                                   [facility]     

 

             

 

                    Heart Rate                         72                       

   10/17/2019       

                                                    [facility]              

      

 

                    Diastolic (mm Hg)                         88                

          10/17/2019

                                                    [facility]              

   

  

 

                    Systolic (mm Hg)                         124                

          10/17/2019

                                                    [facility]              

   

  

 

                    Weight                         277.1                        

  2019        

                                                    [facility]              

      

 

                    Height                         61                          0

2019           

                                                    [facility]              

      

 

                    Temperature Oral (F)                         98.5 F         

                

2019                                                   [facility]     

 

             

 

                    Heart Rate                         60                       

   2019       

                                                    [facility]              

      

 

                    Diastolic (mm Hg)                         76                

          2019

                                                    [facility]              

   

  

 

                    Systolic (mm Hg)                         118                

          2019

                                                    [facility]              

   

  

 

                    Weight                         277.6                        

  2019        

                                                    [facility]              

      

 

                    Height                         61                          0

2019           

                                                    [facility]              

      

 

                    Temperature Oral (F)                         97.8 F         

                

2019                                                   [facility]     

 

             

 

                    Heart Rate                         64                       

   2019       

                                                    [facility]              

      

 

                    Diastolic (mm Hg)                         82                

          2019

                                                    [facility]              

   

  

 

                    Systolic (mm Hg)                         118                

          2019

                                                    [facility]              

   

  

 

                    Weight                         276.4                        

  2019        

                                                    [facility]              

      

 

                    Height                         62                          0

2019           

                                                    [facility]              

      

 

                    Temperature Oral (F)                         98 F           

              

2019                                                   [facility]     

 

             

 

                    Heart Rate                         80                       

   2019       

                                                    [facility]              

      

 

                    Diastolic (mm Hg)                         90                

          2019

                                                    [facility]              

   

  

 

                    Systolic (mm Hg)                         132                

          2019

                                                    [facility]              

   

  

 

                    Weight                         275                          

2019          

                                                    [facility]              

      

 

                    Height                         62                          0

2019           

                                                    [facility]              

      

 

                    Temperature Oral (F)                         98.0 F         

                

2019                                                   [facility]     

 

             

 

                    Heart Rate                         72                       

   2019       

                                                    [facility]              

      

 

                    Diastolic (mm Hg)                         72                

          2019

                                                    [facility]              

   

  

 

                    Systolic (mm Hg)                         130                

          2019

                                                    [facility]              

   

  

 

                    Weight                         272.6                        

  2019        

                                                    [facility]              

      

 

                    Height                         62                          0

2019           

                                                    [facility]              

      

 

                    Temperature Oral (F)                         98.2 F         

                

2019                                                   [facility]     

 

             

 

                    Heart Rate                         74                       

   2019       

                                                    [facility]              

      

 

                    Diastolic (mm Hg)                         78                

          2019

                                                    [facility]              

   

  

 

                    Systolic (mm Hg)                         128                

          2019

                                                    [facility]              

   

  

 

                    Weight                         271                          

2019          

                                                    [facility]              

      

 

                    Height                         62                          0

2019           

                                                    [facility]              

      

 

                    Temperature Oral (F)                         97.7 F         

                

2019                                                   [facility]     

 

             

 

                    Heart Rate                         72                       

   2019       

                                                    [facility]              

      

 

                    Diastolic (mm Hg)                         80                

          2019

                                                    [facility]              

   

  

 

                    Systolic (mm Hg)                         110                

          2019

                                                    [facility]              

   

  

 

                    Weight                         273.2                        

  2018        

                                                    [facility]              

      

 

                    Height                         63                          1

2018           

                                                    [facility]              

      

 

                    Temperature Oral (F)                         97.0 F         

                

2018                                                   [facility]     

 

             

 

                    Heart Rate                         76                       

   2018       

                                                    [facility]              

      

 

                    Diastolic (mm Hg)                         62                

          2018

                                                    [facility]              

   

  

 

                    Systolic (mm Hg)                         138                

          2018

                                                    [facility]              

   

  

 

                    Weight                         278                          

2018          

                                                    [facility]              

      

 

                    Height                         62                          1

2018           

                                                    [facility]              

      

 

                    Temperature Oral (F)                         98.1 F         

                

2018                                                   [facility]     

 

             

 

                    Heart Rate                         76                       

   2018       

                                                    [facility]              

      

 

                    Diastolic (mm Hg)                         68                

          2018

                                                    [facility]              

   

  

 

                    Systolic (mm Hg)                         110                

          2018

                                                    [facility]              

   

  

 

                    Weight                         284.4                        

  2018        

                                                    [facility]              

      

 

                    Height                         62                          0

2018           

                                                    [facility]              

      

 

                    Temperature Oral (F)                         97.8 F         

                

2018                                                   [facility]     

 

             

 

                    Heart Rate                         74                       

   2018       

                                                    [facility]              

      

 

                    Diastolic (mm Hg)                         78                

          2018

                                                    [facility]              

   

  

 

                    Systolic (mm Hg)                         128                

          2018

                                                    [facility]              

   

  

 

                    Weight                         285.4                        

  2018        

                                                    [facility]              

      

 

                    Height                         62                          0

2018           

                                                    [facility]              

      

 

                    Temperature Oral (F)                         96.7 F         

                

2018                                                   [facility]     

 

             

 

                    Heart Rate                         76                       

   2018       

                                                    [facility]              

      

 

                    Diastolic (mm Hg)                         80                

          2018

                                                    [facility]              

   

  

 

                    Systolic (mm Hg)                         132                

          2018

                                                    [facility]              

   

  

 

                    Weight                         283.8                        

  2018        

                                                    [facility]              

      

 

                    Height                         62                          0

2018           

                                                    [facility]              

      

 

                    Temperature Oral (F)                         97.6 F         

                

2018                                                   [facility]     

 

             

 

                    Heart Rate                         78                       

   2018       

                                                    [facility]              

      

 

                    Diastolic (mm Hg)                         82                

          2018

                                                    [facility]              

   

  

 

                    Systolic (mm Hg)                         122                

          2018

                                                    [facility]              

   

  

 

                    Weight                         285                          

2018          

                                                    [facility]              

      

 

                    Height                         62                          0

2018           

                                                    [facility]              

      

 

                    Temperature Oral (F)                         97.4 F         

                

2018                                                   [facility]     

 

             

 

                    Heart Rate                         78                       

   2018       

                                                    [facility]              

      

 

                    Diastolic (mm Hg)                         84                

          2018

                                                    [facility]              

   

  

 

                    Systolic (mm Hg)                         124                

          2018

                                                    [facility]              

   

  

 

                    Weight                         281                          

2018          

                                                    [facility]              

      

 

                    Height                         62                          0

2018           

                                                    [facility]              

      

 

                    Temperature Oral (F)                         98.2 F         

                

2018                                                   [facility]     

 

             

 

                    Heart Rate                         80                       

   2018       

                                                    [facility]              

      

 

                    Diastolic (mm Hg)                         84                

          2018

                                                    [facility]              

   

  

 

                    Systolic (mm Hg)                         110                

          2018

                                                    [facility]              

   

  

 

                    Weight                         281.6                        

  2018        

                                                    [facility]              

      

 

                    Height                         63.5                         

 2018         

                                                    [facility]              

      

 

                    Temperature Oral (F)                         97.2 F         

                

2018                                                   [facility]     

 

             

 

                    Heart Rate                         74                       

   2018       

                                                    [facility]              

      

 

                    Diastolic (mm Hg)                         72                

          2018

                                                    [facility]              

   

  

 

                    Systolic (mm Hg)                         126                

          2018

                                                    [facility]              

   

  

 

                    Weight                         280                          

2018          

                                                    [facility]              

      

 

                    Height                         63                          0

3/06/2018           

                                                    [facility]              

      

 

                    Temperature Oral (F)                         97.5 F         

                

2018                                                   [facility]     

 

             

 

                    Heart Rate                         78                       

   2018       

                                                    [facility]              

      

 

                    Diastolic (mm Hg)                         80                

          2018

                                                    [facility]              

   

  

 

                    Systolic (mm Hg)                         128                

          2018

                                                    [facility]              

   

  

 

                    Weight                         276                          

2018          

                                                    [facility]              

      

 

                    Height                         63                          0

2018           

                                                    [facility]              

      

 

                    Temperature Oral (F)                         97.6 F         

                

2018                                                   [facility]     

 

             

 

                    Heart Rate                         82                       

   2018       

                                                    [facility]              

      

 

                    Diastolic (mm Hg)                         64                

          2018

                                                    [facility]              

   

  

 

                    Systolic (mm Hg)                         114                

          2018

                                                    [facility]              

   

  

 

                    Weight                         277                          

2018          

                                                    [facility]              

      

 

                    Height                         63                          0

2018           

                                                    [facility]              

      

 

                    Temperature Oral (F)                         98.3 F         

                

2018                                                   [facility]     

 

             

 

                    Heart Rate                         80                       

   2018       

                                                    [facility]              

      

 

                    Diastolic (mm Hg)                         70                

          2018

                                                    [facility]              

   

  

 

                    Systolic (mm Hg)                         108                

          2018

                                                    [facility]              

   

  

 

                    Weight                         277                          

2017          

                                                    [facility]              

      

 

                    Height                         63                          1

2017           

                                                    [facility]              

      

 

                    Temperature Oral (F)                         98.1 F         

                

2017                                                   [facility]     

 

             

 

                    Heart Rate                         72                       

   2017       

                                                    [facility]              

      

 

                    Diastolic (mm Hg)                         82                

          2017

                                                    [facility]              

   

  

 

                    Systolic (mm Hg)                         132                

          2017

                                                    [facility]              

   

  

 

                    Weight                         272                          

2017          

                                                    [facility]              

      

 

                    Height                         63                          1

2017           

                                                    [facility]              

      

 

                    Temperature Oral (F)                         97.4 F         

                

2017                                                   [facility]     

 

             

 

                    Heart Rate                         74                       

   2017       

                                                    [facility]              

      

 

                    Diastolic (mm Hg)                         74                

          2017

                                                    [facility]              

   

  

 

                    Systolic (mm Hg)                         112                

          2017

                                                    [facility]              

   

  

 

                    Weight                         268.8                        

  2017        

                                                    [facility]              

      

 

                    Height                         63                          1

2017           

                                                    [facility]              

      

 

                    Temperature Oral (F)                         97.4 F         

                

2017                                                   [facility]     

 

             

 

                    Heart Rate                         70                       

   2017       

                                                    [facility]              

      

 

                    Diastolic (mm Hg)                         80                

          2017

                                                    [facility]              

   

  

 

                    Systolic (mm Hg)                         110                

          2017

                                                    [facility]              

   

  

 

                    Weight                         274                          

10/05/2017          

                                                    [facility]              

      

 

                    Height                         62                          1

           

                                                    [facility]              

      

 

                    Temperature Oral (F)                         98.1 F         

                

10/05/2017                                                   [facility]     

 

             

 

                    Heart Rate                         64                       

   10/05/2017       

                                                    [facility]              

      

 

                    Diastolic (mm Hg)                         68                

          10/05/2017

                                                    [facility]              

   

  

 

                    Systolic (mm Hg)                         110                

          10/05/2017

                                                    [facility]              

   

  

 

                    Weight                         267.1                        

  2017        

                                                    [facility]              

      

 

                    Height                         63                          0

2017           

                                                    [facility]              

      

 

                    Temperature Oral (F)                         97.8 F         

                

2017                                                   [facility]     

 

             

 

                    Heart Rate                         68                       

   2017       

                                                    [facility]              

      

 

                    Diastolic (mm Hg)                         80                

          2017

                                                    [facility]              

   

  

 

                    Systolic (mm Hg)                         110                

          2017

                                                    [facility]              

   

  

 

                    Weight                         275                          

2017          

                                                    [facility]              

      

 

                    Height                         62.2                         

 2017         

                                                    [facility]              

      

 

                    Temperature Oral (F)                         97.2 F         

                

2017                                                   [facility]     

 

             

 

                    Heart Rate                         80                       

   2017       

                                                    [facility]              

      

 

                    Diastolic (mm Hg)                         84                

          2017

                                                    [facility]              

   

  

 

                    Systolic (mm Hg)                         128                

          2017

                                                    [facility]              

   

  

 

                    Weight                         267                          

2017          

                                                    [facility]              

      

 

                    Height                         63                          0

2017           

                                                    [facility]              

      

 

                    Temperature Oral (F)                         97.3 F         

                

2017                                                   [facility]     

 

             

 

                    Heart Rate                         72                       

   2017       

                                                    [facility]              

      

 

                    Diastolic (mm Hg)                         80                

          2017

                                                    [facility]              

   

  

 

                    Systolic (mm Hg)                         100                

          2017

                                                    [facility]              

   

  

 

                    Weight                         265                          

2017          

                                                    [facility]              

      

 

                    Height                         62                          0

2017           

                                                    [facility]              

      

 

                    Temperature Oral (F)                         97.8 F         

                

2017                                                   [facility]     

 

             

 

                    Heart Rate                         76                       

   2017       

                                                    [facility]              

      

 

                    Diastolic (mm Hg)                         72                

          2017

                                                    [facility]              

   

  

 

                    Systolic (mm Hg)                         104                

          2017

                                                    [facility]              

   

  

 

                    Weight                         262                          

2017          

                                                    [facility]              

      

 

                    Height                         62                          0

2017           

                                                    [facility]              

      

 

                    Temperature Oral (F)                         97.9 F         

                

2017                                                   [facility]     

 

             

 

                    Heart Rate                         80                       

   2017       

                                                    [facility]              

      

 

                    Diastolic (mm Hg)                         80                

          2017

                                                    [facility]              

   

  

 

                    Systolic (mm Hg)                         110                

          2017

                                                    [facility]              

   

  

 

                    Weight                         263                          

2017          

                                                    [facility]              

      

 

                    Height                         62                          0

2017           

                                                    [facility]              

      

 

                    Temperature Oral (F)                         98.0 F         

                

2017                                                   [facility]     

 

             

 

                    Heart Rate                         73                       

   2017       

                                                    [facility]              

      

 

                    Diastolic (mm Hg)                         74                

          2017

                                                    [facility]              

   

  

 

                    Systolic (mm Hg)                         160                

          2017

                                                    [facility]              

   

  

 

                    Weight                         262.2                        

  2017        

                                                    [facility]              

      

 

                    Height                         62                          0

2017           

                                                    [facility]              

      

 

                    Temperature Oral (F)                         98.6 F         

                

2017                                                   [facility]     

 

             

 

                    Heart Rate                         74                       

   2017       

                                                    [facility]              

      

 

                    Diastolic (mm Hg)                         80                

          2017

                                                    [facility]              

   

  

 

                    Systolic (mm Hg)                         114                

          2017

                                                    [facility]              

   

  

 

                    Weight                         261.8                        

  03/15/2017        

                                                    [facility]              

      

 

                    Height                         62                          0

3/15/2017           

                                                    [facility]              

      

 

                    Temperature Oral (F)                         98.8 F         

                

03/15/2017                                                   [facility]     

 

             

 

                    Heart Rate                         72                       

   03/15/2017       

                                                    [facility]              

      

 

                    Diastolic (mm Hg)                         80                

          03/15/2017

                                                    [facility]              

   

  

 

                    Systolic (mm Hg)                         122                

          03/15/2017

                                                    [facility]              

   

  

 

                    Weight                         258                          

2017          

                                                    [facility]              

      

 

                    Height                         63                          0

2017           

                                                    [facility]              

      

 

                    Temperature Oral (F)                         97.4 F         

                

2017                                                   [facility]     

 

             

 

                    Heart Rate                         76                       

   2017       

                                                    [facility]              

      

 

                    Diastolic (mm Hg)                         78                

          2017

                                                    [facility]              

   

  

 

                    Systolic (mm Hg)                         122                

          2017

                                                    [facility]              

   

  

 

                    Weight                         271                          

2017          

                                                    [facility]              

      

 

                    Height                         63                          0

2017           

                                                    [facility]              

      

 

                    Temperature Oral (F)                         98.0 F         

                

2017                                                   [facility]     

 

             

 

                    Heart Rate                         70                       

   2017       

                                                    [facility]              

      

 

                    Diastolic (mm Hg)                         85                

          2017

                                                    [facility]              

   

  

 

                    Systolic (mm Hg)                         128                

          2017

                                                    [facility]              

   

  

 

                    Weight                         271                          

12/15/2016          

                                                    [facility]              

      

 

                    Height                         63                          1

2/15/2016           

                                                    [facility]              

      

 

                    Temperature Oral (F)                         98.7 F         

                

12/15/2016                                                   [facility]     

 

             

 

                    Heart Rate                         88                       

   12/15/2016       

                                                    [facility]              

      

 

                    Diastolic (mm Hg)                         72                

          12/15/2016

                                                    [facility]              

   

  

 

                    Systolic (mm Hg)                         124                

          12/15/2016

                                                    [facility]              

   

  

 

                    Weight                         266                          

2016          

                                                    [facility]              

      

 

                    Height                         63                          1

2016           

                                                    [facility]              

      

 

                    Temperature Oral (F)                         98.2 F         

                

2016                                                   [facility]     

 

             

 

                    Heart Rate                         73                       

   2016       

                                                    [facility]              

      

 

                    Diastolic (mm Hg)                         102               

           

2016                                                   [facility]     

 

             

 

                    Systolic (mm Hg)                         142                

          2016

                                                    [facility]              

   

  

 

                    Weight                         268                          

10/24/2016          

                                                    [facility]              

      

 

                    Height                         63                          1

           

                                                    [facility]              

      

 

                    Temperature Oral (F)                         98.3 F         

                

10/24/2016                                                   [facility]     

 

             

 

                    Heart Rate                         72                       

   10/24/2016       

                                                    [facility]              

      

 

                    Diastolic (mm Hg)                         80                

          10/24/2016

                                                    [facility]              

   

  

 

                    Systolic (mm Hg)                         122                

          10/24/2016

                                                    [facility]              

   

  

 

                    Weight                         258                          

2016          

                                                    [facility]              

      

 

                    Height                         62                          0

2016           

                                                    [facility]              

      

 

                    Temperature Oral (F)                         98.0 F         

                

2016                                                   [facility]     

 

             

 

                    Heart Rate                         78                       

   2016       

                                                    [facility]              

      

 

                    Diastolic (mm Hg)                         72                

          2016

                                                    [facility]              

   

  

 

                    Systolic (mm Hg)                         126                

          2016

                                                    [facility]              

   

  

 

                    Weight                         273                          

2016          

                                                    [facility]              

      

 

                    Height                         62                          0

2016           

                                                    [facility]              

      

 

                    Temperature Oral (F)                         97.4 F         

                

2016                                                   [facility]     

 

             

 

                    Heart Rate                         76                       

   2016       

                                                    [facility]              

      

 

                    Diastolic (mm Hg)                         72                

          2016

                                                    [facility]              

   

  

 

                    Systolic (mm Hg)                         124                

          2016

                                                    [facility]              

   

  

 

                    Weight                         274                          

2016          

                                                    [facility]              

      

 

                    Height                         62                          0

2016           

                                                    [facility]              

      

 

                    Temperature Oral (F)                         98.3 F         

                

2016                                                   [facility]     

 

             

 

                    Diastolic (mm Hg)                         76                

          2016

                                                    [facility]              

   

  

 

                    Systolic (mm Hg)                         122                

          2016

                                                    [facility]              

   

  

 

                    Weight                         266.8                        

  05/10/2016        

                                                    [facility]              

      

 

                    Height                         62.5                         

 05/10/2016         

                                                    [facility]              

      

 

                    Temperature Oral (F)                         98.8 F         

                

05/10/2016                                                   [facility]     

 

             

 

                    Heart Rate                         80                       

   05/10/2016       

                                                    [facility]              

      

 

                    Diastolic (mm Hg)                         80                

          05/10/2016

                                                    [facility]              

   

  

 

                    Systolic (mm Hg)                         118                

          05/10/2016

                                                    [facility]              

   

  

 

                    Weight                         271                          

2016          

                                                    [facility]              

      

 

                    Height                         63                          0

2016           

                                                    [facility]              

      

 

                    Temperature Oral (F)                         97.3 F         

                

2016                                                   [facility]     

 

             

 

                    Heart Rate                         74                       

   2016       

                                                    [facility]              

      

 

                    Diastolic (mm Hg)                         80                

          2016

                                                    [facility]              

   

  

 

                    Systolic (mm Hg)                         120                

          2016

                                                    Gen Braga              

   

  

 

                    Weight                         273                          

2016          

                                                    [facility]              

      

 

                    Height                         63                          0

3/09/2016           

                                                    [facility]              

      

 

                    Temperature Oral (F)                         98.9 F         

                

2016                                                   [facility]     

 

             

 

                    Heart Rate                         76                       

   2016       

                                                    [facility]              

      

 

                    Diastolic (mm Hg)                         84                

          2016

                                                    [facility]              

   

  

 

                    Systolic (mm Hg)                         124                

          2016

                                                    [facility]              

   

  

 

                    Weight                         276                          

2016          

                                                    [facility]              

      

 

                    Height                         63                          0

2016           

                                                    [facility]              

      

 

                    Temperature Oral (F)                         97.7 F         

                

2016                                                   [facility]     

 

             

 

                    Heart Rate                         80                       

   2016       

                                                    [facility]              

      

 

                    Diastolic (mm Hg)                         80                

          2016

                                                    [facility]              

   

  

 

                    Systolic (mm Hg)                         110                

          2016

                                                    [facility]              

   

  

 

                    Weight                         274                          

2015          

                                                    Gen [facility]              

      

 

                    Height                         63                          1

2015           

                                                    [facility]              

      

 

                    Temperature Oral (F)                         98.0 F         

                

2015                                                   [facility]     

 

             

 

                    Heart Rate                         74                       

   2015       

                                                    [facility]              

      

 

                    Diastolic (mm Hg)                         83                

          2015

                                                    [facility]              

   

  

 

                    Systolic (mm Hg)                         123                

          2015

                                                    [facility]              

   

  

 

                    Weight                         270                          

10/29/2015          

                                                    Gen [facility]              

      

 

                    Height                         63                          1

           

                                                    [facility]              

      

 

                    Temperature Oral (F)                         98.3 F         

                

10/29/2015                                                   [facility]     

 

             

 

                    Heart Rate                         78                       

   10/29/2015       

                                                    Gen [facility]              

      

 

                    Diastolic (mm Hg)                         84                

          10/29/2015

                                                    Gen [facility]              

   

  

 

                    Systolic (mm Hg)                         124                

          10/29/2015

                                                    Gen [facility]              

   

  

 

                    Weight                         263                          

2015          

                                                    [facility]              

      

 

                    Height                         63                          0

2015           

                                                    [facility]              

      

 

                    Temperature Oral (F)                         97.6 F         

                

2015                                                   [facility]     

 

             

 

                    Heart Rate                         76                       

   2015       

                                                    [facility]              

      

 

                    Diastolic (mm Hg)                         62                

          2015

                                                    [facility]              

   

  

 

                    Systolic (mm Hg)                         126                

          2015

                                                    [facility]              

   

  

 

                    Weight                         262                          

2015          

                                                    [facility]              

      

 

                    Height                         63                          0

2015           

                                                    [facility]              

      

 

                    Temperature Oral (F)                         97.4 F         

                

2015                                                   [facility]     

 

             

 

                    Heart Rate                         76                       

   2015       

                                                    [facility]              

      

 

                    Diastolic (mm Hg)                         76                

          2015

                                                    [facility]              

   

  

 

                    Systolic (mm Hg)                         124                

          2015

                                                    [facility]              

   

  



                                                                                
                                                                                
                                                                                
                                                                                
                                                                                
                                                                                
                                                                                
                                                                                
                                                                                
                                                                                
                                                                                
                                                                                
                                                                                
                                                                                
                                                                                
                                                                                
                                                                                
                                                                                
                                                                                
                                                                                
                                                                                
                                                                                
                                                                                
                                                                                
                                                                                
                                                                                
                                                                                
                                                                                
                                                                                
                                                                                
                                                                                
                                                                                
                                                                                
                                                                                
                                                                                
                                                                                
                                                                                
                                                                                
                                                                                
                                                                                
                                                                                
                                                                                
                                                                                
                                                                                
                                                                                
                                                                                
                                                                                
                                                                                
                                                                                
                                                                                
                                                                                
                                                                                
                                                                                
                                                                                
                                                                                
                                                                                
                                                                                
                                               



Encounters

                    



                    Location                         Location Details           

              

Encounter Type                         Encounter Number                         

Reason For Visit                         Attending Provider                     

                    ADM Date                         DC Date                    

     Status      

                                        Source                    

 

                    Enio Braga MD                                        

          [facility]      

                          [id number]                  

        

                                                     10/31/2014                 

   

                    10/31/2014                                                  

Gen Braga MD                                        

          [facility]      

                          [id number]                  

        

                                                     10/31/2014                 

   

                    10/31/2014                                                  

Gen Braga MD                                        

          [facility]      

                          [id number]                  

        

                                                     10/31/2014                 

   

                    10/31/2014                                                  

Gen Braga MD                                        

          [facility]      

                          [id number]                  

        

                                                     10/31/2014                 

   

                    10/31/2014                                                  

Gen Braga MD                                        

          [facility]      

                          [id number]                  

        

                                                     10/31/2014                 

   

                    10/31/2014                                                  

Gen Braga MD                                        

          [facility]      

                          [id number]                  

        

                                                     10/31/2014                 

   

                    10/31/2014                                                  

Gen Braga MD                                        

          [facility]      

                          [id number]                  

        

                                                     10/31/2014                 

   

                    10/31/2014                                                  

Gen Braga MD                                        

          [facility]      

                          [id number]                  

        

                                                     10/31/2014                 

   

                    10/31/2014                                                  

Gen Braga MD                                        

          [facility]      

                          [id number]                  

        

                                                     10/31/2014                 

   

                    10/31/2014                                                  

Gen Braga MD                                        

          [facility]      

                          [id number]                  

        

                                                     10/31/2014                 

   

                    10/31/2014                                                  

Gen Braga MD                                        

          [facility]      

                          [id number]                  

        

                                                     10/31/2014                 

   

                    10/31/2014                                                  

Gen [facility]aga MD                                        

          [facility]      

                          [id number]                  

        

                                                     10/31/2014                 

   

                    10/31/2014                                                  

Gen Braga MD                                        

          [facility]      

                          [id number]                  

        

                                                     10/31/2014                 

   

                    10/31/2014                                                  

Gen Braga MD                                        

          [facility]      

                          [id number]                  

        

                                                     10/31/2014                 

   

                    10/31/2014                                                  

Gen Braga MD                                        

          [facility]      

                          [id number]                  

        

                                                     10/31/2014                 

   

                    10/31/2014                                                  

Gen Braga MD                                        

          [facility]      

                          [id number]                  

        

                                                     10/31/2014                 

   

                    10/31/2014                                                  

Gen Braga MD                                        

          [facility]      

                          [id number]                  

        

                                                     10/31/2014                 

   

                    10/31/2014                                                  

Gen Braga MD                                        

          [facility]      

                          [id number]                  

        

                                                     10/31/2014                 

   

                    10/31/2014                                                  

Gen Braga MD                                        

          [facility]      

                          [id number]                  

        

                                                     10/31/2014                 

   

                    10/31/2014                                                  

Gen Braga MD                                        

          [facility]      

                          [id number]                  

        

                                                     10/31/2014                 

   

                    10/31/2014                                                  

Gen Braga MD                                        

          [facility]      

                          [id number]                  

        

                                                     10/31/2014                 

   

                    10/31/2014                                                  

Gen Braga MD                                        

          [facility]      

                          [id number]                  

        

                                                     10/31/2014                 

   

                    10/31/2014                                                  

Gen Braga MD                                        

          [facility]      

                          [id number]                  

        

                                                     10/31/2014                 

   

                    10/31/2014                                                  

Gen Braga MD                                        

          [facility]      

                          [id number]                  

        

                                                     10/31/2014                 

   

                    10/31/2014                                                  

Gen Braga MD                                        

          [facility]      

                          [id number]                  

        

                                                     10/31/2014                 

   

                    10/31/2014                                                  

Gen Braga MD                                        

          [facility]      

                          [id number]                  

        

                                                     10/31/2014                 

   

                    10/31/2014                                                  

Gen Braga MD                                        

          [facility]      

                          [id number]                  

        

                                                     10/31/2014                 

   

                    10/31/2014                                                  

Gen Braga MD                                        

          [facility]      

                          [id number]                  

        

                                                     10/31/2014                 

   

                    10/31/2014                                                  

Gen Braga MD                                        

          [facility]      

                          [id number]                  

        

                                                     10/31/2014                 

   

                    10/31/2014                                                  

Gen Braga MD                                        

          [facility]      

                          [id number]                  

        

                                                     10/31/2014                 

   

                    10/31/2014                                                  

Gen Braga MD                                        

          [facility]      

                          [id number]                  

        

                                                     10/31/2014                 

   

                    10/31/2014                                                  

Gen Braga MD                                        

          [facility]      

                          [id number]                  

        

                                                     10/31/2014                 

   

                    10/31/2014                                                  

Gen Braga MD                                        

          [facility]      

                          [id number]                  

        

                                                     10/31/2014                 

   

                    10/31/2014                                                  

Gen Braga MD                                        

          [facility]      

                          [id number]                  

        

                                                     10/31/2014                 

   

                    10/31/2014                                                  

Gen Braga MD                                        

          [facility]      

                          [id number]                  

        

                                                     10/31/2014                 

   

                    10/31/2014                                                  

Gen Braga MD                                        

          [facility]      

                          [id number]                  

        

                                                     10/31/2014                 

   

                    10/31/2014                                                  

Gen Braga MD                                        

          dexa      

                          [id number]                  

        

                                                     10/31/2014                 

   

                    10/31/2014                                                  

Gen Braga MD                                        

          dexa      

                          [id number]                  

        

                                                     10/31/2014                 

   

                    10/31/2014                                                  

Gen Braga MD                                        

          dexa      

                          [id number]                  

        

                                                     10/31/2014                 

   

                    10/31/2014                                                  

Gen Braga MD                                        

          Unknown   

                                        [id number]    

                   

                                                                        20

14                 

                    2014                                                  

Gen Braga MD                                        

          Unknown   

                                        [id number]    

                   

                                                                        20

14                 

                    2014                                                  

Gen Braga MD                                        

          Unknown   

                                        [id number]    

                   

                                                                        20

14                 

                    2014                                                  

Gen Braga MD                                        

          Unknown   

                                        [id number]    

                   

                                                                        20

14                 

                    2014                                                  

Gen Braga MD                                        

          Unknown   

                                        [id number]    

                   

                                                                        20

14                 

                    2014                                                  

Gen Braga MD                                        

          Unknown   

                                        [id number]    

                   

                                                                        20

14                 

                    2014                                                  

Gen Braga MD                                        

          Unknown   

                                        [id number]    

                   

                                                                        20

14                 

                    2014                                                  

Gen Braga MD                                        

          Unknown   

                                        [id number]    

                   

                                                                        20

14                 

                    2014                                                  

Gen Braga MD                                        

          Unknown   

                                        [id number]    

                   

                                                                        20

14                 

                    2014                                                  

Gen Braga MD                                        

          Unknown   

                                        [id number]    

                   

                                                                        20

14                 

                    2014                                                  

Gen Braga MD                                        

          Unknown   

                                        [id number]    

                   

                                                                        20

14                 

                    2014                                                  

Gen Braga MD                                        

          Unknown   

                                        [id number]    

                   

                                                                        20

14                 

                    2014                                                  

Gen Braga MD                                        

          Unknown   

                                        [id number]    

                   

                                                                        20

14                 

                    2014                                                  

Gen Braga MD                                        

          Unknown   

                                        [id number]    

                   

                                                                        20

14                 

                    2014                                                  

Gen Braga MD                                        

          Unknown   

                                        [id number]    

                   

                                                                        20

14                 

                    2014                                                  

Gen Braga MD                                        

          Unknown   

                                        [id number]    

                   

                                                                        20

14                 

                    2014                                                  

Gne Braga MD                                        

          Unknown   

                                        [id number]    

                   

                                                                        20

14                 

                    2014                                                  

Gen Braga MD                                        

          Unknown   

                                        [id number]    

                   

                                                                        20

14                 

                    2014                                                  

Gen Braga MD                                        

          Unknown   

                                        [id number]    

                   

                                                                        20

14                 

                    2014                                                  

Gen Braga MD                                        

          Unknown   

                                        [id number]    

                   

                                                                        20

14                 

                    2014                                                  

Gen Braga MD                                        

          Unknown   

                                        [id number]    

                   

                                                                        20

14                 

                    2014                                                  

Gen Braga MD                                        

          Unknown   

                                        [id number]    

                   

                                                                        20

14                 

                    2014                                                  

Gen Braga MD                                        

          Unknown   

                                        [id number]    

                   

                                                                        20

14                 

                    2014                                                  

Gen Braga MD                                        

          Unknown   

                                        [id number]    

                   

                                                                        20

14                 

                    2014                                                  

Gen Braga MD                                        

          Unknown   

                                        [id number]    

                   

                                                                        20

14                 

                    2014                                                  

Gen Braga MD                                        

          Unknown   

                                        [id number]    

                   

                                                                        20

14                 

                    2014                                                  

Gen Braga MD                                        

          Unknown   

                                        [id number]    

                   

                                                                        20

14                 

                    2014                                                  

Gen Braga MD                                        

          Unknown   

                                        [id number]    

                   

                                                                        20

14                 

                    2014                                                  

Gen Braga MD                                        

          Unknown   

                                        [id number]    

                   

                                                                        20

14                 

                    2014                                                  

Gen Braga MD                                        

          Unknown   

                                        [id number]    

                   

                                                                        20

14                 

                    2014                                                  

Gen Braga MD                                        

          Unknown   

                                        [id number]    

                   

                                                                        20

14                 

                    2014                                                  

Gen Braga MD                                        

          Unknown   

                                        [id number]    

                   

                                                                        20

14                 

                    2014                                                  

Gen Braga MD                                        

          Unknown   

                                        [id number]    

                   

                                                                        20

14                 

                    2014                                                  

Gen Braga MD                                        

          Unknown   

                                        [id number]    

                   

                                                                        20

14                 

                    2014                                                  

Gen Braga MD                                        

          Unknown   

                                        [id number]    

                   

                                                                        20

14                 

                    2014                                                  

Gen Braga MD                                        

          Unknown   

                                        [id number]    

                   

                                                                        20

14                 

                    2014                                                  

Gen Braga MD                                        

          Unknown   

                                        [id number]    

                   

                                                                        20

14                 

                    2014                                                  

Gen Braga MD                                        

          Unknown   

                                        [id number]    

                   

                                                                        20

14                 

                    2014                                                  

Gen Braga MD                                        

           f/u    

                                        [id number]    

                    

                                                                        20

15                  

                    2015                                                  

Gen Braga MD                                        

           f/u    

                                        [id number]    

                    

                                                                        20

15                  

                    2015                                                  

Gen Braga MD                                        

           f/u    

                                        [id number]    

                    

                                                                        20

15                  

                    2015                                                  

Gen Braga MD                                        

           f/u    

                                        [id number]    

                    

                                                                        20

15                  

                    2015                                                  

Gen Braga MD                                        

           f/u    

                                        [id number]    

                    

                                                                        20

15                  

                    2015                                                  

Gen Braga MD                                        

           f/u    

                                        [id number]    

                    

                                                                        20

15                  

                    2015                                                  

Gen Braga MD                                        

           f/u    

                                        [id number]    

                    

                                                                        20

15                  

                    2015                                                  

Gen Braga MD                                        

           f/u    

                                        [id number]    

                    

                                                                        20

15                  

                    2015                                                  

Gen Braga MD                                        

           f/u    

                                        [id number]    

                    

                                                                        20

15                  

                    2015                                                  

Gen Braga MD                                        

           f/u    

                                        [id number]    

                    

                                                                        20

15                  

                    2015                                                  

Gen Braga MD                                        

           f/u    

                                        [id number]    

                    

                                                                        20

15                  

                    2015                                                  

Gen Braga MD                                        

          [facility]/u    

                                        [id number]    

                    

                                                                        20

15                  

                    2015                                                  

Gen Braga MD                                        

           f/u    

                                        [id number]    

                    

                                                                        20

15                  

                    2015                                                  

Gen Brgaa MD                                        

          [facility]/u    

                                        [id number]    

                    

                                                                        20

15                  

                    2015                                                  

Gen Braga MD                                        

           f/u    

                                        [id number]    

                    

                                                                        20

15                  

                    2015                                                  

Gen Braga MD                                        

          [facility]/u    

                                        [id number]    

                    

                                                                        20

15                  

                    2015                                                  

Gen Braga MD                                        

           f/u    

                                        [id number]    

                    

                                                                        20

15                  

                    2015                                                  

Gen Braga MD                                        

           f/u    

                                        [id number]    

                    

                                                                        20

15                  

                    2015                                                  

Gen Braga MD                                        

          [facility]/u    

                                        [id number]    

                    

                                                                        20

15                  

                    2015                                                  

Gen Braga MD                                        

          [facility]/u    

                                        [id number]    

                    

                                                                        20

15                  

                    2015                                                  

Gen Braga MD                                        

           f/u    

                                        [id number]    

                    

                                                                        20

15                  

                    2015                                                  

Gen Braga MD                                        

           f/u    

                                        [id number]    

                    

                                                                        20

15                  

                    2015                                                  

Gen Braga MD                                        

           f/u    

                                        [id number]    

                    

                                                                        20

15                  

                    2015                                                  

Gen Braga MD                                        

           f/u    

                                        [id number]    

                    

                                                                        20

15                  

                    2015                                                  

Gen Braga MD                                        

           f/u    

                                        [id number]    

                    

                                                                        20

15                  

                    2015                                                  

Gen Braga MD                                        

           f/u    

                                        [id number]    

                    

                                                                        20

15                  

                    2015                                                  

Gen Braga MD                                        

           f/u    

                                        [id number]    

                    

                                                                        20

15                  

                    2015                                                  

Gen Braga MD                                        

           f/u    

                                        [id number]    

                    

                                                                        20

15                  

                    2015                                                  

Gen Braga MD                                        

           f/u    

                                        [id number]    

                    

                                                                        20

15                  

                    2015                                                  

Gen Braga MD                                        

           f/u    

                                        [id number]    

                    

                                                                        20

15                  

                    2015                                                  

Gen Braga MD                                        

           f/u    

                                        [id number]    

                    

                                                                        20

15                  

                    2015                                                  

Gen Braga MD                                        

           f/u    

                                        [id number]    

                    

                                                                        20

15                  

                    2015                                                  

Gen Braga MD                                        

           f/u    

                                        [id number]    

                    

                                                                        20

15                  

                    2015                                                  

Gen Braga MD                                        

           f/u    

                                        [id number]    

                    

                                                                        20

15                  

                    2015                                                  

Gen Braga MD                                        

          [facility]/u    

                                        [id number]    

                    

                                                                        20

15                  

                    2015                                                  

Gen Braga MD                                        

          [facility]/u    

                                        [id number]    

                    

                                                                        20

15                  

                    2015                                                  

Gen Braga MD                                        

           f/u    

                                        [id number]    

                    

                                                                        20

15                  

                    2015                                                  

Gen Braga MD                                        

           f/u    

                                        [id number]    

                    

                                                                        20

15                  

                    2015                                                  

Gen Braga MD                                        

          4W F/U   

                                        [id number]    

                   

                                                                        20

15                 

                    2015                                                  

Gen Braga MD                                        

          4 F/U   

                                        [id number]    

                   

                                                                        20

15                 

                    2015                                                  

Gen Braga MD                                        

          4WK F/U   

                                        [id number]    

                   

                                                                        20

15                 

                    2015                                                  

Gen Braga MD                                        

          4W F/U   

                                        [id number]    

                   

                                                                        20

15                 

                    2015                                                  

Gen Braga MD                                        

          4WK F/U   

                                        [id number]    

                   

                                                                        20

15                 

                    2015                                                  

Gen Braga MD                                        

          4W F/U   

                                        [id number]    

                   

                                                                        20

15                 

                    2015                                                  

Gen Braga MD                                        

          4WLADONNA F/U   

                                        [id number]    

                   

                                                                        20

15                 

                    2015                                                  

Gen Braga MD                                        

          4W F/U   

                                        [id number]    

                   

                                                                        20

15                 

                    2015                                                  

Gen Braga MD                                        

          4WLADONNA F/U   

                                        [id number]    

                   

                                                                        20

15                 

                    2015                                                  

Gen Braga MD                                        

          4WLADONNA F/U   

                                        [id number]    

                   

                                                                        20

15                 

                    2015                                                  

Gen Braga MD                                        

          4WK F/U   

                                        [id number]    

                   

                                                                        20

15                 

                    2015                                                  

Gen Braga MD                                        

          4WL[place]NA F/U   

                                        [id number]    

                   

                                                                        20

15                 

                    2015                                                  

Gen Braga MD                                        

          4WK F/U   

                                        [id number]    

                   

                                                                        20

15                 

                    2015                                                  

Gen Braga MD                                        

          4WK F/U   

                                        [id number]    

                   

                                                                        20

15                 

                    2015                                                  

Gen Braga MD                                        

          4WK F/U   

                                        [id number]    

                   

                                                                        20

15                 

                    2015                                                  

Gen Braga MD                                        

          4W F/U   

                                        [id number]    

                   

                                                                        20

15                 

                    2015                                                  

Gen Braga MD                                        

          4WK F/U   

                                        [id number]    

                   

                                                                        20

15                 

                    2015                                                  

Gen Braga MD                                        

          4WLADONNA F/U   

                                        [id number]    

                   

                                                                        20

15                 

                    2015                                                  

Gen Braga MD                                        

          4W F/U   

                                        [id number]    

                   

                                                                        20

15                 

                    2015                                                  

Gen Braga MD                                        

          4WK F/U   

                                        [id number]    

                   

                                                                        20

15                 

                    2015                                                  

Gen Braga MD                                        

          4WK F/U   

                                        [id number]    

                   

                                                                        20

15                 

                    2015                                                  

Gen Braga MD                                        

          4WK F/U   

                                        [id number]    

                   

                                                                        20

15                 

                    2015                                                  

Gen Braga MD                                        

          4WLAD[place]NA F/U   

                                        [id number]    

                   

                                                                        20

15                 

                    2015                                                  

Gen Braga MD                                        

          4LADONNA F/U   

                                        [id number]    

                   

                                                                        20

15                 

                    2015                                                  

Gen Braga MD                                        

          4WL[facility]NA F/U   

                                        [id number]    

                   

                                                                        20

15                 

                    2015                                                  

Gen Braga MD                                        

          4 F/U   

                                        [id number]    

                   

                                                                        20

15                 

                    2015                                                  

Gen Braga MD                                        

          4W F/U   

                                        [id number]    

                   

                                                                        20

15                 

                    2015                                                  

Gen Braga MD                                        

          4W F/U   

                                        [id number]    

                   

                                                                        20

15                 

                    2015                                                  

Gen Braga MD                                        

          4WLAD[place]NA F/U   

                                        [id number]    

                   

                                                                        20

15                 

                    2015                                                  

Gen Braga MD                                        

          4WK F/U   

                                        [id number]    

                   

                                                                        20

15                 

                    2015                                                  

Gen Braga MD                                        

          4W F/U   

                                        [id number]    

                   

                                                                        20

15                 

                    2015                                                  

Gen Braga MD                                        

          4W F/U   

                                        [id number]    

                   

                                                                        20

15                 

                    2015                                                  

Gen Braga MD                                        

          4WK F/U   

                                        [id number]    

                   

                                                                        20

15                 

                    2015                                                  

Gen Braga MD                                        

          4WK F/U   

                                        [id number]    

                   

                                                                        20

15                 

                    2015                                                  

Gen Braga MD                                        

          4WK F/U   

                                        [id number]    

                   

                                                                        20

15                 

                    2015                                                  

Gen Braga MD                                        

          4WK F/U   

                                        [id number]    

                   

                                                                        20

15                 

                    2015                                                  

Gen Braga MD                                        

          4WK F/U   

                                        [id number]    

                   

                                                                        20

15                 

                    2015                                                  

Gen Braga MD                                        

          4WK F/U   

                                        [id number]    

                   

                                                                        20

15                 

                    2015                                                  

Gen Braga MD                                        

          4WK F/U   

                                        [id number]    

                   

                                                                        20

15                 

                    2015                                                  

Gen Braga MD                                        

          4WK F/U   

                                        [id number]    

                   

                                                                        20

15                 

                    2015                                                  

Gen Braga MD                                        

          4WK F/U   

                                        [id number]    

                   

                                                                        20

15                 

                    2015                                                  

Gen Braga MD                                        

          4WK F/U   

                                        [id number]    

                   

                                                                        20

15                 

                    2015                                                  

Gen Braga MD                                        

          4WK F/U   

                                        [id number]    

                   

                                                                        20

15                 

                    2015                                                  

Gen Braga MD                                        

          4WK F/U   

                                        [id number]    

                   

                                                                        20

15                 

                    2015                                                  

Gen Braga MD                                        

          4WK F/U   

                                        [id number]    

                   

                                                                        20

15                 

                    2015                                                  

Gen Braga MD                                        

          4WK F/U   

                                        [id number]    

                   

                                                                        20

15                 

                    2015                                                  

Gen Braga MD                                        

          4WK F/U   

                                        [id number]    

                   

                                                                        20

15                 

                    2015                                                  

Gen Braga MD                                        

          4WK F/U   

                                        [id number]    

                   

                                                                        20

15                 

                    2015                                                  

Gen Braga MD                                        

          4WK F/U   

                                        [id number]    

                   

                                                                        20

15                 

                    2015                                                  

Gen Braga MD                                        

          4WK F/U   

                                        [id number]    

                   

                                                                        20

15                 

                    2015                                                  

Gen Braga MD                                        

          4WK F/U   

                                        [id number]    

                   

                                                                        20

15                 

                    2015                                                  

Gen Braga MD                                        

          4WK F/U   

                                        [id number]    

                   

                                                                        20

15                 

                    2015                                                  

Gen Braga MD                                        

          4WK F/U   

                                        [id number]    

                   

                                                                        20

15                 

                    2015                                                  

Gen Braga MD                                        

          4WK F/U   

                                        [id number]    

                   

                                                                        20

15                 

                    2015                                                  

Gen Braga MD                                        

          4WK F/U   

                                        [id number]    

                   

                                                                        20

15                 

                    2015                                                  

Gen Braga MD                                        

          4WK F/U   

                                        [id number]    

                   

                                                                        20

15                 

                    2015                                                  

Gen Braga MD                                        

          4WK F/U   

                                        [id number]    

                   

                                                                        20

15                 

                    2015                                                  

Gen Braga MD                                        

          4WK F/U   

                                        [id number]    

                   

                                                                        20

15                 

                    2015                                                  

Gen Braga MD                                        

          4WK F/U   

                                        [id number]    

                   

                                                                        20

15                 

                    2015                                                  

Gen Braga MD                                        

          4WK F/U   

                                        [id number]    

                   

                                                                        20

15                 

                    2015                                                  

Gen Braga MD                                        

          4WK F/U   

                                        [id number]    

                   

                                                                        20

15                 

                    2015                                                  

Gen Braga MD                                        

          4WK F/U   

                                        [id number]    

                   

                                                                        20

15                 

                    2015                                                  

Gen Braga MD                                        

          4WK F/U   

                                        [id number]    

                   

                                                                        20

15                 

                    2015                                                  

Gen Braga MD                                        

          4WK F/U   

                                        [id number]    

                   

                                                                        20

15                 

                    2015                                                  

Gen Braga MD                                        

          4WK F/U   

                                        [id number]    

                   

                                                                        20

15                 

                    2015                                                  

Gen Braga MD                                        

          4WK F/U   

                                        [id number]    

                   

                                                                        20

15                 

                    2015                                                  

Gen Braga MD                                        

          4WK F/U   

                                        [id number]    

                   

                                                                        20

15                 

                    2015                                                  

Gen Braga MD                                        

          4WK F/U   

                                        [id number]    

                   

                                                                        20

15                 

                    2015                                                  

Gen Braga MD                                        

          4WK F/U   

                                        [id number]    

                   

                                                                        20

15                 

                    2015                                                  

Gen Braga MD                                        

          4WK F/U   

                                        [id number]    

                   

                                                                        20

15                 

                    2015                                                  

Gen Braga MD                                        

          4WK F/U   

                                        [id number]    

                   

                                                                        20

15                 

                    2015                                                  

Gen Braga MD                                        

          4WK F/U   

                                        [id number]    

                   

                                                                        20

15                 

                    2015                                                  

Gen Braga MD                                        

          4WK F/U   

                                        [id number]    

                   

                                                                        20

15                 

                    2015                                                  

Gen Braga MD                                        

          4WK F/U   

                                        [id number]    

                   

                                                                        20

15                 

                    2015                                                  

Gen Braga MD                                        

          4WK F/U   

                                        [id number]    

                   

                                                                        20

15                 

                    2015                                                  

Gen Braga MD                                        

          4WK F/U   

                                        [id number]    

                   

                                                                        20

15                 

                    2015                                                  

Gen Braga MD                                        

          4WK F/U   

                                        [id number]    

                   

                                                                        20

15                 

                    2015                                                  

Gen Braga MD                                        

          4WK F/U   

                                        [id number]    

                   

                                                                        20

15                 

                    2015                                                  

Gen Braga MD                                        

          4WK F/U   

                                        [id number]    

                   

                                                                        20

15                 

                    2015                                                  

Gen Braga MD                                        

          4WK F/U   

                                        [id number]    

                   

                                                                        20

15                 

                    2015                                                  

Gen Braga MD                                        

          4WK F/U   

                                        [id number]    

                   

                                                                        20

15                 

                    2015                                                  

Gen Braga MD                                        

          4WK F/U   

                                        [id number]    

                   

                                                                        20

15                 

                    2015                                                  

Gen Braga MD                                        

          4W F/U   

                                        [id number]    

                   

                                                                        20

15                 

                    2015                                                  

Gen Braga MD                                        

          4W F/U   

                                        [id number]    

                   

                                                                        20

15                 

                    2015                                                  

Gen Braga MD                                        

          4W F/U   

                                        [id number]    

                   

                                                                        20

15                 

                    2015                                                  

Gen Braga MD                                        

          4WK F/U   

                                        [id number]    

                   

                                                                        20

15                 

                    2015                                                  

Gen Braga MD                                        

          4WK F/U   

                                        [id number]    

                   

                                                                        20

15                 

                    2015                                                  

Gen Braga MD                                        

          4WK F/U   

                                        [id number]    

                   

                                                                        20

15                 

                    2015                                                  

Gen Braga MD                                        

          4WK F/U   

                                        [id number]    

                   

                                                                        20

15                 

                    2015                                                  

Gen Braga MD                                        

          4W F/U   

                                        [id number]    

                   

                                                                        20

15                 

                    2015                                                  

Gen Braga MD                                        

          4WK F/U   

                                        [id number]    

                   

                                                                        20

15                 

                    2015                                                  

Gen Braga MD                                        

          4WK F/U   

                                        [id number]    

                   

                                                                        20

15                 

                    2015                                                  

Gen Braga MD                                        

          4WK F/U   

                                        [id number]    

                   

                                                                        20

15                 

                    2015                                                  

Gen Braga MD                                        

          4WK F/U   

                                        [id number]    

                   

                                                                        20

15                 

                    2015                                                  

Gen Braga MD                                        

          4WK F/U   

                                        [id number]    

                   

                                                                        20

15                 

                    2015                                                  

Gen Braga MD                                        

          4WK F/U   

                                        [id number]    

                   

                                                                        20

15                 

                    2015                                                  

Gen Braga MD                                        

          4WK F/U   

                                        [id number]    

                   

                                                                        20

15                 

                    2015                                                  

Gen Braga MD                                        

          4WK F/U   

                                        [id number]    

                   

                                                                        20

15                 

                    2015                                                  

Gen Braga MD                                        

          4WK F/U   

                                        [id number]    

                   

                                                                        20

15                 

                    2015                                                  

Gen Braga MD                                        

          4WK F/U   

                                        [id number]    

                   

                                                                        20

15                 

                    2015                                                  

Gen Braga MD                                        

          4WK F/U   

                                        [id number]    

                   

                                                                        20

15                 

                    2015                                                  

Gen Braga MD                                        

          4WK F/U   

                                        [id number]    

                   

                                                                        20

15                 

                    2015                                                  

Gen Braga MD                                        

          4WK F/U   

                                        [id number]    

                   

                                                                        20

15                 

                    2015                                                  

Gen Braga MD                                        

          4W F/U   

                                        [id number]    

                   

                                                                        20

15                 

                    2015                                                  

Gen Braga MD                                        

          4W F/U   

                                        [id number]    

                   

                                                                        20

15                 

                    2015                                                  

Gen Braga MD                                        

          4W F/U   

                                        [id number]    

                   

                                                                        20

15                 

                    2015                                                  

Gen Braga MD                                        

          4W F/U   

                                        [id number]    

                   

                                                                        20

15                 

                    2015                                                  

Gen Braga MD                                        

          4WK F/U   

                                        [id number]    

                   

                                                                        20

15                 

                    2015                                                  

Gen Braga MD                                        

          4WK F/U   

                                        [id number]    

                   

                                                                        20

15                 

                    2015                                                  

Gen Braga MD                                        

          4W F/U   

                                        [id number]    

                   

                                                                        20

15                 

                    2015                                                  

Gen Braga MD                                        

          4WK F/U   

                                        [id number]    

                   

                                                                        20

15                 

                    2015                                                  

Gen Braga MD                                        

          4WK F/U   

                                        [id number]    

                   

                                                                        20

15                 

                    2015                                                  

Gen Braga MD                                        

          4WK F/U   

                                        [id number]    

                   

                                                                        20

15                 

                    2015                                                  

Gen Braga MD                                        

          4WK F/U   

                                        [id number]    

                   

                                                                        20

15                 

                    2015                                                  

Gen Braga MD                                        

          MRI       

                          [id number]                  

         

                                                     05/10/2015                 

    

                    05/10/2015                                                  

Gen Braga MD                                        

          MRI       

                          [id number]                  

         

                                                     05/10/2015                 

    

                    05/10/2015                                                  

Gen Braga MD                                        

          MRI       

                          [id number]                  

         

                                                     05/10/2015                 

    

                    05/10/2015                                                  

Gen Braga MD                                        

          MRI       

                          [id number]                  

         

                                                     05/10/2015                 

    

                    05/10/2015                                                  

Gen Braga MD                                        

          MRI       

                          [id number]                  

         

                                                     05/10/2015                 

    

                    05/10/2015                                                  

Gen Braga MD                                        

          MRI       

                          [id number]                  

         

                                                     05/10/2015                 

    

                    05/10/2015                                                  

Gen Braga MD                                        

          MRI       

                          [id number]                  

         

                                                     05/10/2015                 

    

                    05/10/2015                                                  

Gen Braga MD                                        

          MRI       

                          [id number]                  

         

                                                     05/10/2015                 

    

                    05/10/2015                                                  

Gen Braga MD                                        

          MRI       

                          [id number]                  

         

                                                     05/10/2015                 

    

                    05/10/2015                                                  

Gen Braga MD                                        

          MRI       

                          [id number]                  

         

                                                     05/10/2015                 

    

                    05/10/2015                                                  

Gen Braga MD                                        

          MRI       

                          [id number]                  

         

                                                     05/10/2015                 

    

                    05/10/2015                                                  

Gen Braga MD                                        

          MRI       

                          [id number]                  

         

                                                     05/10/2015                 

    

                    05/10/2015                                                  

Gen Braga MD                                        

          MRI       

                          [id number]                  

         

                                                     05/10/2015                 

    

                    05/10/2015                                                  

Gen Braga MD                                        

          MRI       

                          [id number]                  

         

                                                     05/10/2015                 

    

                    05/10/2015                                                  

Gen Braga MD                                        

          MRI       

                          [id number]                  

         

                                                     05/10/2015                 

    

                    05/10/2015                                                  

Gen Braga MD                                        

          MRI       

                          [id number]                  

         

                                                     05/10/2015                 

    

                    05/10/2015                                                  

Gen Braga MD                                        

          MRI       

                          [id number]                  

         

                                                     05/10/2015                 

    

                    05/10/2015                                                  

Gen Braga MD                                        

          MRI       

                          [id number]                  

         

                                                     05/10/2015                 

    

                    05/10/2015                                                  

Gen Braga MD                                        

          MRI       

                          [id number]                  

         

                                                     05/10/2015                 

    

                    05/10/2015                                                  

Gen Braga MD                                        

          MRI       

                          [id number]                  

         

                                                     05/10/2015                 

    

                    05/10/2015                                                  

Gen Braga MD                                        

          MRI       

                          [id number]                  

         

                                                     05/10/2015                 

    

                    05/10/2015                                                  

Gen Braga MD                                        

          MRI       

                          [id number]                  

         

                                                     05/10/2015                 

    

                    05/10/2015                                                  

Gen Braga MD                                        

          MRI       

                          [id number]                  

         

                                                     05/10/2015                 

    

                    05/10/2015                                                  

Gen Braga MD                                        

          MRI       

                          [id number]                  

         

                                                     05/10/2015                 

    

                    05/10/2015                                                  

Gen Braga MD                                        

          MRI       

                          [id number]                  

         

                                                     05/10/2015                 

    

                    05/10/2015                                                  

Gen Braga MD                                        

          MRI       

                          [id number]                  

         

                                                     05/10/2015                 

    

                    05/10/2015                                                  

Gen Braga MD                                        

          MRI       

                          [id number]                  

         

                                                     05/10/2015                 

    

                    05/10/2015                                                  

Gen Braga MD                                        

          MRI       

                          [id number]                  

         

                                                     05/10/2015                 

    

                    05/10/2015                                                  

Gen Braga MD                                        

          MRI       

                          [id number]                  

         

                                                     05/10/2015                 

    

                    05/10/2015                                                  

Gen Braga MD                                        

          MRI       

                          [id number]                  

         

                                                     05/10/2015                 

    

                    05/10/2015                                                  

Gen Braga MD                                        

          MRI       

                          [id number]                  

         

                                                     05/10/2015                 

    

                    05/10/2015                                                  

Gen Braga MD                                        

          MRI       

                          [id number]                  

         

                                                     05/10/2015                 

    

                    05/10/2015                                                  

Gen Braga MD                                        

          [facility]       

                          [id number]                  

         

                                                     05/10/2015                 

    

                    05/10/2015                                                  

Gen Braga MD                                        

          MRI       

                          [id number]                  

         

                                                     05/10/2015                 

    

                    05/10/2015                                                  

Gen Braga MD                                        

          [facility]       

                          [id number]                  

         

                                                     05/10/2015                 

    

                    05/10/2015                                                  

Gen Braga MD                                        

          [facility]       

                          [id number]                  

         

                                                     05/10/2015                 

    

                    05/10/2015                                                  

Gen Braga MD                                        

          MRI       

                          [id number]                  

         

                                                     05/10/2015                 

    

                    05/10/2015                                                  

Gen Braga MD                                        

          MRI       

                          [id number]                  

         

                                                     05/10/2015                 

    

                    05/10/2015                                                  

Gen Braga MD                                        

          Refill- 

Folic Acid                              [id number]    

     

                                                                        20

15   

                          2015                                            

     

                                        Gen Braga MD                                        

          Refill- 

Folic Acid                              [id number]    

     

                                                                        20

15   

                          2015                                            

     

                                        Gen Braga MD                                        

          Refill- 

Folic Acid                              [id number]    

     

                                                                        20

15   

                          2015                                            

     

                                        Gen Braga MD                                        

          Refill- 

Folic Acid                              [id number]    

     

                                                                        20

15   

                          2015                                            

     

                                        Gen Braga MD                                        

          Refill- 

Folic Acid                              [id number]    

     

                                                                        20

15   

                          2015                                            

     

                                        Gen Braga MD                                        

          Refill- 

Folic Acid                              [id number]    

     

                                                                        20

15   

                          2015                                            

     

                                        Gen Braga MD                                        

          Refill- 

Folic Acid                              [id number]    

     

                                                                        20

15   

                          2015                                            

     

                                        Gen Braga MD                                        

          Refill- 

Folic Acid                              [id number]    

     

                                                                        20

15   

                          2015                                            

     

                                        Gen Braga MD                                        

          Refill- 

Folic Acid                              [id number]    

     

                                                                        20

15   

                          2015                                            

     

                                        Gen Braga MD                                        

          Refill- 

Folic Acid                              [id number]    

     

                                                                        20

15   

                          2015                                            

     

                                        Gen Braga MD                                        

          Refill- 

Folic Acid                              [id number]    

     

                                                                        20

15   

                          2015                                            

     

                                        Gen Braga MD                                        

          Refill- 

Folic Acid                              [id number]    

     

                                                                        20

15   

                          2015                                            

     

                                        Gen Braga MD                                        

          Refill- 

Folic Acid                              [id number]    

     

                                                                        20

15   

                          2015                                            

     

                                        Gen Braga MD                                        

          Refill- 

Folic Acid                              [id number]    

     

                                                                        20

15   

                          2015                                            

     

                                        Gen Braga MD                                        

          Refill- 

Folic Acid                              [id number]    

     

                                                                        20

15   

                          2015                                            

     

                                        Gen Braga MD                                        

          Refill- 

Folic Acid                              [id number]    

     

                                                                        20

15   

                          2015                                            

     

                                        Gen Braga MD                                        

          Refill- 

Folic Acid                              [id number]    

     

                                                                        20

15   

                          2015                                            

     

                                        Gen Braga MD                                        

          4WK F/U   

                                        [id number]    

                   

                                                                        20

15                 

                    2015                                                  

eGn Braga MD                                        

          4WK F/U   

                                        [id number]    

                   

                                                                        20

15                 

                    2015                                                  

Gen Braga MD                                        

          4WK F/U   

                                        [id number]    

                   

                                                                        20

15                 

                    2015                                                  

Gen Braga MD                                        

          4WK F/U   

                                        [id number]    

                   

                                                                        20

15                 

                    2015                                                  

Gen Braga MD                                        

          4WK F/U   

                                        [id number]    

                   

                                                                        20

15                 

                    2015                                                  

Gen Braga MD                                        

          4WK F/U   

                                        [id number]    

                   

                                                                        20

15                 

                    2015                                                  

Gen Braga MD                                        

          4WK F/U   

                                        [id number]    

                   

                                                                        20

15                 

                    2015                                                  

Gen Braga MD                                        

          4WK F/U   

                                        [id number]    

                   

                                                                        20

15                 

                    2015                                                  

Gen Braga MD                                        

          4WK F/U   

                                        [id number]    

                   

                                                                        20

15                 

                    2015                                                  

Gen Braga MD                                        

          4WK F/U   

                                        [id number]    

                   

                                                                        20

15                 

                    2015                                                  

Gen Braga MD                                        

          4WK F/U   

                                        [id number]    

                   

                                                                        20

15                 

                    2015                                                  

Gen Braga MD                                        

          4WK F/U   

                                        [id number]    

                   

                                                                        20

15                 

                    2015                                                  

Gen Braga MD                                        

          4WK F/U   

                                        [id number]    

                   

                                                                        20

15                 

                    2015                                                  

Gen Braga MD                                        

          4WK F/U   

                                        [id number]    

                   

                                                                        20

15                 

                    2015                                                  

Gen Braga MD                                        

          4WK /U   

                                        [id number]    

                   

                                                                        20

15                 

                    2015                                                  

Gen Braga MD                                        

          4WK F/U   

                                        [id number]    

                   

                                                                        20

15                 

                    2015                                                  

Gen Braga MD                                        

          4WK /U   

                                        [id number]    

                   

                                                                        20

15                 

                    2015                                                  

Gen Braga MD                                        

          Refill- 

Folic Acid                              [id number]    

     

                                                                        20

15   

                          2015                                            

     

                                        Gen Braga MD                                        

          Refill- 

Folic Acid                              [id number]    

     

                                                                        20

15   

                          2015                                            

     

                                        Gen Braga MD                                        

          Refill- 

Folic Acid                              [id number]    

     

                                                                        20

15   

                          2015                                            

     

                                        Gen Braga MD                                        

          Refill- 

Folic Acid                              [id number]    

     

                                                                        20

15   

                          2015                                            

     

                                        Gen Braga MD                                        

          Refill- 

Folic Acid                              [id number]    

     

                                                                        20

15   

                          2015                                            

     

                                        Gen Braga MD                                        

          Refill- 

Folic Acid                              [id number]    

     

                                                                        20

15   

                          2015                                            

     

                                        Gen Braga MD                                        

          Refill- 

Folic Acid                              [id number]    

     

                                                                        20

15   

                          2015                                            

     

                                        Gen Braga MD                                        

          Refill- 

Folic Acid                              [id number]    

     

                                                                        20

15   

                          2015                                            

     

                                        Gen Braga MD                                        

          Refill- 

Folic Acid                              [id number]    

     

                                                                        20

15   

                          2015                                            

     

                                        Gen Braga MD                                        

          Refill- 

Folic Acid                              [id number]    

     

                                                                        20

15   

                          2015                                            

     

                                        Gen Braga MD                                        

          Refill- 

Folic Acid                              [id number]    

     

                                                                        20

15   

                          2015                                            

     

                                        Gen Braga MD                                        

          Refill- 

Folic Acid                              [id number]    

     

                                                                        20

15   

                          2015                                            

     

                                        Gen Braga MD                                        

          Refill- 

Folic Acid                              [id number]    

     

                                                                        20

15   

                          2015                                            

     

                                        Gen Braga MD                                        

          Refill- 

Folic Acid                              [id number]    

     

                                                                        20

15   

                          2015                                            

     

                                        Gen Braga MD                                        

          Refill- 

Folic Acid                              [id number]    

     

                                                                        20

15   

                          2015                                            

     

                                        Gen Braga MD                                        

          Refill- 

Folic Acid                              [id number]    

     

                                                                        20

15   

                          2015                                            

     

                                        Gen Braga MD                                        

          Refill- 

Folic Acid                              [id number]    

     

                                                                        20

15   

                          2015                                            

     

                                        Gen Braga MD                                        

          Refill- 

Folic Acid                              [id number]    

     

                                                                        20

15   

                          2015                                            

     

                                        Gen Braga MD                                        

          Refill- 

Folic Acid                              [id number]    

     

                                                                        20

15   

                          2015                                            

     

                                        Gen Braga MD                                        

          4WK F/U   

                                        [id number]    

                   

                                                                        20

15                 

                    2015                                                  

Gen Braga MD                                        

          4WK F/U   

                                        [id number]    

                   

                                                                        20

15                 

                    2015                                                  

Gen Braga MD                                        

          4WK F/U   

                                        [id number]    

                   

                                                                        20

15                 

                    2015                                                  

Gen Braga MD                                        

          4WK F/U   

                                        [id number]    

                   

                                                                        20

15                 

                    2015                                                  

Gen Braga MD                                        

          4WK F/U   

                                        [id number]    

                   

                                                                        20

15                 

                    2015                                                  

Gen Braga MD                                        

          4WK F/U   

                                        [id number]    

                   

                                                                        20

15                 

                    2015                                                  

Gen Braga MD                                        

          4WK F/U   

                                        [id number]    

                   

                                                                        20

15                 

                    2015                                                  

Gen Braga MD                                        

          4WK F/U   

                                        [id number]    

                   

                                                                        20

15                 

                    2015                                                  

Gen Braga MD                                        

          4WK F/U   

                                        [id number]    

                   

                                                                        20

15                 

                    2015                                                  

Gen Braga MD                                        

          4WK F/U   

                                        [id number]    

                   

                                                                        20

15                 

                    2015                                                  

Gen Braga MD                                        

          4WK F/U   

                                        [id number]    

                   

                                                                        20

15                 

                    2015                                                  

Gen Braga MD                                        

          4WK F/U   

                                        [id number]    

                   

                                                                        20

15                 

                    2015                                                  

Gen Braga MD                                        

          4WK F/U   

                                        [id number]    

                   

                                                                        20

15                 

                    2015                                                  

Gen Braga MD                                        

          4WK F/U   

                                        [id number]    

                   

                                                                        20

15                 

                    2015                                                  

Gen Braga MD                                        

          4WK F/U   

                                        [id number]    

                   

                                                                        20

15                 

                    2015                                                  

Gen Braga MD                                        

          4WK F/U   

                                        [id number]    

                   

                                                                        20

15                 

                    2015                                                  

Gen Braga MD                                        

          4WK F/U   

                                        [id number]    

                   

                                                                        20

15                 

                    2015                                                  

Gne Braga MD                                        

          4WK F/U   

                                        [id number]    

                   

                                                                        20

15                 

                    2015                                                  

Gen Braga MD                                        

          4WK F/U   

                                        [id number]    

                   

                                                                        20

15                 

                    2015                                                  

Gen Braga MD                                        

          4WK F/U   

                                        [id number]    

                   

                                                                        20

15                 

                    2015                                                  

Gen Braga MD                                        

          4WK F/U   

                                        [id number]    

                   

                                                                        20

15                 

                    2015                                                  

Gen Braga MD                                        

          4WK F/U   

                                        [id number]    

                   

                                                                        20

15                 

                    2015                                                  

Gen Braga MD                                        

          4WK F/U   

                                        [id number]    

                   

                                                                        20

15                 

                    2015                                                  

Gen Braga MD                                        

          4WK F/U   

                                        [id number]    

                   

                                                                        20

15                 

                    2015                                                  

Gen Braga MD                                        

          4WK F/U   

                                        [id number]    

                   

                                                                        20

15                 

                    2015                                                  

Gen Braga MD                                        

          4WK F/U   

                                        [id number]    

                   

                                                                        20

15                 

                    2015                                                  

Gen Braga MD                                        

          4WK F/U   

                                        [id number]    

                   

                                                                        20

15                 

                    2015                                                  

Gen Braga MD                                        

          4WK F/U   

                                        [id number]    

                   

                                                                        20

15                 

                    2015                                                  

Gen Braga MD                                        

          4WK F/U   

                                        [id number]    

                   

                                                                        20

15                 

                    2015                                                  

Gen Braga MD                                        

          4WK F/U   

                                        [id number]    

                   

                                                                        20

15                 

                    2015                                                  

Gen Braga MD                                        

          4WK F/U   

                                        [id number]    

                   

                                                                        20

15                 

                    2015                                                  

Gen Braga MD                                        

          4WK F/U   

                                        [id number]    

                   

                                                                        20

15                 

                    2015                                                  

Gen Braga MD                                        

          4WK F/U   

                                        [id number]    

                   

                                                                        20

15                 

                    2015                                                  

Gen Braga MD                                        

          4WK F/U   

                                        [id number]    

                   

                                                                        20

15                 

                    2015                                                  

Gen Braga MD                                        

          4WK F/U   

                                        [id number]    

                   

                                                                        20

15                 

                    2015                                                  

Gen Braga MD                                        

          4WK F/U   

                                        [id number]    

                   

                                                                        20

15                 

                    2015                                                  

Gen Braga MD                                        

          4WK F/U   

                                        [id number]    

                   

                                                                        20

15                 

                    2015                                                  

Gen Braga MD                                        

          4WK F/U   

                                        [id number]    

                   

                                                                        20

15                 

                    2015                                                  

Gen Braga MD                                        

          4W F/U   

                                        [id number]    

                   

                                                                        20

15                 

                    2015                                                  

Gen Braga MD                                        

          4W F/U   

                                        [id number]    

                   

                                                                        20

15                 

                    2015                                                  

Gen Braga MD                                        

          4WK F/U   

                                        [id number]    

                   

                                                                        20

15                 

                    2015                                                  

Gen Braga MD                                        

          4W F/U   

                                        [id number]    

                   

                                                                        20

15                 

                    2015                                                  

Gen Braga MD                                        

          4W F/U   

                                        [id number]    

                   

                                                                        20

15                 

                    2015                                                  

Gen Braga MD                                        

          4W F/U   

                                        [id number]    

                   

                                                                        20

15                 

                    2015                                                  

Gen Braga MD                                        

          4W F/U   

                                        [id number]    

                   

                                                                        20

15                 

                    2015                                                  

Gen Braga MD                                        

          4WK F/U   

                                        [id number]    

                   

                                                                        20

15                 

                    2015                                                  

Gen Braga MD                                        

          4W F/U   

                                        [id number]    

                   

                                                                        20

15                 

                    2015                                                  

Gen Braga MD                                        

          4WK F/U   

                                        [id number]    

                   

                                                                        20

15                 

                    2015                                                  

Gen Braga MD                                        

          4W F/U   

                                        [id number]    

                   

                                                                        20

15                 

                    2015                                                  

Gen Braga MD                                        

          4W F/U   

                                        [id number]    

                   

                                                                        20

15                 

                    2015                                                  

Gen Braga MD                                        

          4WK F/U   

                                        [id number]    

                   

                                                                        20

15                 

                    2015                                                  

Gen Braga MD                                        

          4W F/U   

                                        [id number]    

                   

                                                                        20

15                 

                    2015                                                  

Gen Braga MD                                        

          4W F/U   

                                        [id number]    

                   

                                                                        20

15                 

                    2015                                                  

Gen Braga MD                                        

          4W F/U   

                                        [id number]    

                   

                                                                        20

15                 

                    2015                                                  

Gen Braga MD                                        

          4W F/U   

                                        [id number]    

                   

                                                                        20

15                 

                    2015                                                  

Gen Braga MD                                        

          MRI       

                          [id number]                  

         

                                                     2015                                                  

Gen Braga MD                                        

          MRI       

                          [id number]                  

         

                                                     2015                                                  

Gen Braga MD                                        

          MRI       

                          [id number]                  

         

                                                     2015                                                  

Gen Braga MD                                        

          MRI       

                          [id number]                  

         

                                                     2015                                                  

Gen Braga MD                                        

          MRI       

                          [id number]                  

         

                                                     2015                                                  

Gen Braga MD                                        

          MRI       

                          [id number]                  

         

                                                     2015                                                  

Gen Braga MD                                        

          MRI       

                          [id number]                  

         

                                                     2015                                                  

Gen Braga MD                                        

          MRI       

                          [id number]                  

         

                                                     2015                                                  

Gen Braga MD                                        

          MRI       

                          [id number]                  

         

                                                     2015                                                  

Gen Braga MD                                        

          MRI       

                          [id number]                  

         

                                                     2015                                                  

Gen Braga MD                                        

          MRI       

                          [id number]                  

         

                                                     2015                                                  

Gen Braga MD                                        

          MRI       

                          [id number]                  

         

                                                     2015                                                  

Gen Braga MD                                        

          MRI       

                          [id number]                  

         

                                                     2015                                                  

Gen Braga MD                                        

          MRI       

                          [id number]                  

         

                                                     2015                                                  

Gen Braga MD                                        

          MRI       

                          [id number]                  

         

                                                     2015                                                  

Gen Braga MD                                        

          MRI       

                          [id number]                  

         

                                                     2015                                                  

Gen Braga MD                                        

          MRI       

                          [id number]                  

         

                                                     2015                                                  

Gen Braga MD                                        

          MRI       

                          [id number]                  

         

                                                     2015                                                  

Gen Braga MD                                        

          MRI       

                          [id number]                  

         

                                                     2015                                                  

Gen Braga MD                                        

          MRI       

                          [id number]                  

         

                                                     2015                                                  

Gen Braga MD                                        

          MRI       

                          [id number]                  

         

                                                     2015                                                  

Gen Braga MD                                        

          MRI       

                          [id number]                  

         

                                                     2015                                                  

Gen Braga MD                                        

          MRI       

                          [id number]                  

         

                                                     2015                                                  

Gen Braga MD                                        

          MRI       

                          [id number]                  

         

                                                     2015                                                  

Gen Braga MD                                        

          MRI       

                          [id number]                  

         

                                                     2015                                                  

Gen Braga MD                                        

          MRI       

                          [id number]                  

         

                                                     2015                                                  

Gen Braga MD                                        

          MRI       

                          [id number]                  

         

                                                     2015                                                  

Gen Braga MD                                        

          MRI       

                          [id number]                  

         

                                                     2015                                                  

Gen Braga MD                                        

          MRI       

                          [id number]                  

         

                                                     2015                                                  

Gen Braga MD                                        

          MRI       

                          [id number]                  

         

                                                     2015                                                  

Gen Braga MD                                        

          MRI       

                          [id number]                  

         

                                                     2015                                                  

Gen Braga MD                                        

          MRI       

                          [id number]                  

         

                                                     2015                                                  

Gen Braga MD                                        

          MRI       

                          [id number]                  

         

                                                     2015                                                  

Gen Braga MD                                        

          MRI       

                          [id number]                  

         

                                                     2015                                                  

Gen Braga MD                                        

          MRI       

                          [id number]                  

         

                                                     2015                                                  

Gen Braga MD                                        

          MRI       

                          [id number]                  

         

                                                     2015                                                  

Gen Braga MD                                        

          4WK F/U   

                                        [id number]    

                   

                                                                        20

15                 

                    2015                                                  

Gen Braga MD                                        

          4WK F/U   

                                        [id number]    

                   

                                                                        20

15                 

                    2015                                                  

Gen Braga MD                                        

          4WK F/U   

                                        [id number]    

                   

                                                                        20

15                 

                    2015                                                  

Gen Braga MD                                        

          4WK F/U   

                                        [id number]    

                   

                                                                        20

15                 

                    2015                                                  

Gen Braga MD                                        

          4WK F/U   

                                        [id number]    

                   

                                                                        20

15                 

                    2015                                                  

Gen Braga MD                                        

          4WK F/U   

                                        [id number]    

                   

                                                                        20

15                 

                    2015                                                  

Gen Braga MD                                        

          4WK F/U   

                                        [id number]    

                   

                                                                        20

15                 

                    2015                                                  

Gen Braga MD                                        

          4WK F/U   

                                        [id number]    

                   

                                                                        20

15                 

                    2015                                                  

Gen Braga MD                                        

          4WK F/U   

                                        [id number]    

                   

                                                                        20

15                 

                    2015                                                  

Gen Braga MD                                        

          4WK F/U   

                                        [id number]    

                   

                                                                        20

15                 

                    2015                                                  

Gen Braga MD                                        

          4W F/U   

                                        [id number]    

                   

                                                                        20

15                 

                    2015                                                  

Gen Braga MD                                        

          4W F/U   

                                        [id number]    

                   

                                                                        20

15                 

                    2015                                                  

Gen Braga MD                                        

          4W F/U   

                                        [id number]    

                   

                                                                        20

15                 

                    2015                                                  

Gen Braga MD                                        

          4W F/U   

                                        [id number]    

                   

                                                                        20

15                 

                    2015                                                  

Gen Braga MD                                        

          4W F/U   

                                        [id number]    

                   

                                                                        20

15                 

                    2015                                                  

Gen Braga MD                                        

          4W F/U   

                                        [id number]    

                   

                                                                        20

15                 

                    2015                                                  

Gen Braga MD                                        

          4WLADONNA F/U   

                                        [id number]    

                   

                                                                        20

15                 

                    2015                                                  

Gen Braga MD                                        

          4WK F/U   

                                        [id number]    

                   

                                                                        20

15                 

                    2015                                                  

Gen Braag MD                                        

          4W F/U   

                                        [id number]    

                   

                                                                        20

15                 

                    2015                                                  

Gen Braga MD                                        

          4W F/U   

                                        [id number]    

                   

                                                                        20

15                 

                    2015                                                  

Gen Braga MD                                        

          4WK F/U   

                                        [id number]    

                   

                                                                        20

15                 

                    2015                                                  

Gen Braga MD                                        

          4WK F/U   

                                        [id number]    

                   

                                                                        20

15                 

                    2015                                                  

Gen Braga MD                                        

          4WK F/U   

                                        [id number]    

                   

                                                                        20

15                 

                    2015                                                  

Gen Braga MD                                        

          4WK F/U   

                                        [id number]    

                   

                                                                        20

15                 

                    2015                                                  

Gen Braga MD                                        

          4WK F/U   

                                        [id number]    

                   

                                                                        20

15                 

                    2015                                                  

Gen Braga MD                                        

          4WK F/U   

                                        [id number]    

                   

                                                                        20

15                 

                    2015                                                  

Gen Braga MD                                        

          4WK F/U   

                                        [id number]    

                   

                                                                        20

15                 

                    2015                                                  

Gen Braga MD                                        

          4WK F/U   

                                        [id number]    

                   

                                                                        20

15                 

                    2015                                                  

Gen Braga MD                                        

          4WK F/U   

                                        [id number]    

                   

                                                                        20

15                 

                    2015                                                  

Gen Braga MD                                        

          4WK F/U   

                                        [id number]    

                   

                                                                        20

15                 

                    2015                                                  

Gen Braga MD                                        

          4WK F/U   

                                        [id number]    

                   

                                                                        20

15                 

                    2015                                                  

Gen Braga MD                                        

          4WK F/U   

                                        [id number]    

                   

                                                                        20

15                 

                    2015                                                  

Gen Braga MD                                        

          4WK F/U   

                                        [id number]    

                   

                                                                        20

15                 

                    2015                                                  

Gen Braga MD                                        

          4WK F/U   

                                        [id number]    

                   

                                                                        20

15                 

                    2015                                                  

Gen Braga MD                                        

          4WK F/U   

                                        [id number]    

                   

                                                                        20

15                 

                    2015                                                  

Gen Braga MD                                        

          Unknown   

                                        [id number]    

                   

                                                                        20

15                 

                    2015                                                  

Gen Braga MD                                        

          Unknown   

                                        [id number]    

                   

                                                                        20

15                 

                    2015                                                  

Gen Braga MD                                        

          Unknown   

                                        [id number]    

                   

                                                                        20

15                 

                    2015                                                  

Gen Braga MD                                        

          Unknown   

                                        [id number]    

                   

                                                                        20

15                 

                    2015                                                  

Gen Braga MD                                        

          Unknown   

                                        [id number]    

                   

                                                                        20

15                 

                    2015                                                  

Gen Braga MD                                        

          Unknown   

                                        [id number]    

                   

                                                                        20

15                 

                    2015                                                  

Gen Braga MD                                        

          Unknown   

                                        [id number]    

                   

                                                                        20

15                 

                    2015                                                  

Gen Braga MD                                        

          Unknown   

                                        [id number]    

                   

                                                                        20

15                 

                    2015                                                  

Gen Braga MD                                        

          Unknown   

                                        [id number]    

                   

                                                                        20

15                 

                    2015                                                  

Gen Braga MD                                        

          Unknown   

                                        [id number]    

                   

                                                                        20

15                 

                    2015                                                  

Gen Braga MD                                        

          Unknown   

                                        [id number]    

                   

                                                                        20

15                 

                    2015                                                  

Gen Braga MD                                        

          Unknown   

                                        [id number]    

                   

                                                                        20

15                 

                    2015                                                  

Gen Braga MD                                        

          Unknown   

                                        [id number]    

                   

                                                                        20

15                 

                    2015                                                  

Gen Braga MD                                        

          Unknown   

                                        [id number]    

                   

                                                                        20

15                 

                    2015                                                  

Gen Braga MD                                        

          Unknown   

                                        [id number]    

                   

                                                                        20

15                 

                    2015                                                  

Gen Braga MD                                        

          Unknown   

                                        [id number]    

                   

                                                                        20

15                 

                    2015                                                  

Gen Braga MD                                        

          Unknown   

                                        [id number]    

                   

                                                                        20

15                 

                    2015                                                  

Gen Braga MD                                        

          Unknown   

                                        [id number]    

                   

                                                                        20

15                 

                    2015                                                  

Gen Braga MD                                        

          Unknown   

                                        [id number]    

                   

                                                                        20

15                 

                    2015                                                  

Gen Braga MD                                        

          Unknown   

                                        [id number]    

                   

                                                                        20

15                 

                    2015                                                  

Gen Braga MD                                        

          Unknown   

                                        [id number]    

                   

                                                                        20

15                 

                    2015                                                  

Gen Braga MD                                        

          Unknown   

                                        [id number]    

                   

                                                                        20

15                 

                    2015                                                  

Gen Braga MD                                        

          Unknown   

                                        [id number]    

                   

                                                                        20

15                 

                    2015                                                  

Gen Braga MD                                        

          Unknown   

                                        [id number]    

                   

                                                                        20

15                 

                    2015                                                  

Gen Braga MD                                        

          Unknown   

                                        [id number]    

                   

                                                                        20

15                 

                    2015                                                  

Gen Braga MD                                        

          Unknown   

                                        [id number]    

                   

                                                                        20

15                 

                    2015                                                  

Gen Braga MD                                        

          Unknown   

                                        [id number]    

                   

                                                                        20

15                 

                    2015                                                  

Gen Braga MD                                        

          Unknown   

                                        [id number]    

                   

                                                                        20

15                 

                    2015                                                  

Gen Braga MD                                        

          Unknown   

                                        [id number]    

                   

                                                                        20

15                 

                    2015                                                  

Gen Braga MD                                        

          Unknown   

                                        [id number]    

                   

                                                                        20

15                 

                    2015                                                  

Gen Braga MD                                        

          Unknown   

                                        [id number]    

                   

                                                                        20

15                 

                    2015                                                  

Gen Braga MD                                        

          Unknown   

                                        [id number]    

                   

                                                                        20

15                 

                    2015                                                  

Gen Braga MD                                        

          Unknown   

                                        [id number]    

                   

                                                                        20

15                 

                    2015                                                  

Gen Braga MD                                        

          Unknown   

                                        [id number]    

                   

                                                                        20

15                 

                    2015                                                  

Gen Braga MD                                        

          Unknown   

                                        [id number]    

                   

                                                                        20

15                 

                    2015                                                  

Gen Braga MD                                        

          Rx and 

Scheduling                              [id number]    

     

                                                                        20

15   

                          2015                                            

     

                                        Gen Braga MD                                        

          Rx and 

Scheduling                              [id number]    

     

                                                                        20

15   

                          2015                                            

     

                                        Gen Braga MD                                        

          Rx and 

Scheduling                              [id number]    

     

                                                                        20

15   

                          2015                                            

     

                                        Gen Braga MD                                        

          Rx and 

Scheduling                              [id number]    

     

                                                                        20

15   

                          2015                                            

     

                                        Gen Braga MD                                        

          Rx and 

Scheduling                              [id number]    

     

                                                                        20

15   

                          2015                                            

     

                                        Gen Braga MD                                        

          Rx and 

Scheduling                              [id number]    

     

                                                                        20

15   

                          2015                                            

     

                                        Gen Braga MD                                        

          Rx and 

Scheduling                              [id number]    

     

                                                                        20

15   

                          2015                                            

     

                                        Gen Braga MD                                        

          Rx and 

Scheduling                              [id number]    

     

                                                                        20

15   

                          2015                                            

     

                                        Gen Braga MD                                        

          Rx and 

Scheduling                              [id number]    

     

                                                                        20

15   

                          2015                                            

     

                                        Gen Braga MD                                        

          Rx and 

Scheduling                              [id number]    

     

                                                                        20

15   

                          2015                                            

     

                                        Gen Braga MD                                        

          Rx and 

Scheduling                              [id number]    

     

                                                                        20

15   

                          2015                                            

     

                                        Gen Braga MD                                        

          Rx and 

Scheduling                              [id number]    

     

                                                                        20

15   

                          2015                                            

     

                                        Gen Braga MD                                        

          Rx and 

Scheduling                              [id number]    

     

                                                                        20

15   

                          2015                                            

     

                                        Gen Braga MD                                        

          Rx and 

Scheduling                              [id number]    

     

                                                                        20

15   

                          2015                                            

     

                                        Gen Braga MD                                        

          Rx and 

Scheduling                              [id number]    

     

                                                                        20

15   

                          2015                                            

     

                                        Gen Braga MD                                        

          Rx and 

Scheduling                              [id number]    

     

                                                                        20

15   

                          2015                                            

     

                                        Gen Braga MD                                        

          Rx and 

Scheduling                              [id number]    

     

                                                                        20

15   

                          2015                                            

     

                                        Gen Braga MD                                        

          Rx and 

Scheduling                              [id number]    

     

                                                                        20

15   

                          2015                                            

     

                                        Gen Braga MD                                        

          Rx and 

Scheduling                              [id number]    

     

                                                                        20

15   

                          2015                                            

     

                                        Gen Braga MD                                        

          Rx and 

Scheduling                              [id number]    

     

                                                                        20

15   

                          2015                                            

     

                                        Gen Braga MD                                        

          Rx and 

Scheduling                              [id number]    

     

                                                                        20

15   

                          2015                                            

     

                                        Gen Braga MD                                        

          Rx and 

Scheduling                              [id number]    

     

                                                                        20

15   

                          2015                                            

     

                                        Gen Braga MD                                        

          Rx and 

Scheduling                              [id number]    

     

                                                                        20

15   

                          2015                                            

     

                                        Gen Braga MD                                        

          Rx and 

Scheduling                              [id number]    

     

                                                                        20

15   

                          2015                                            

     

                                        Gen Braga MD                                        

          Rx and 

Scheduling                              [id number]    

     

                                                                        20

15   

                          2015                                            

     

                                        Gen Braga MD                                        

          Rx and 

Scheduling                              [id number]    

     

                                                                        20

15   

                          2015                                            

     

                                        Gen Braga MD                                        

          Rx and 

Scheduling                              [id number]    

     

                                                                        20

15   

                          2015                                            

     

                                        Gen Braga MD                                        

          Rx and 

Scheduling                              [id number]    

     

                                                                        20

15   

                          2015                                            

     

                                        Gen Braga MD                                        

          Rx and 

Scheduling                              [id number]    

     

                                                                        20

15   

                          2015                                            

     

                                        Gen Braga MD                                        

          Rx and 

Scheduling                              [id number]    

     

                                                                        20

15   

                          2015                                            

     

                                        Gen Braga MD                                        

          Rx and 

Scheduling                              [id number]    

     

                                                                        20

15   

                          2015                                            

     

                                        Gen Braga MD                                        

          Rx and 

Scheduling                              [id number]    

     

                                                                        20

15   

                          2015                                            

     

                                        Gen Braga MD                                        

          Rx and 

Scheduling                              [id number]    

     

                                                                        20

15   

                          2015                                            

     

                                        Gen Braga MD                                        

          Rx and 

Scheduling                              [id number]    

     

                                                                        20

15   

                          2015                                            

     

                                        Gen Braga MD                                        

          [facility] 

REFILL                                  [id number]    

         

                                                                        20

15       

                    2015                                                  

Gen Braga MD                                        

          [employer] 

REFILL                                  [id number]    

         

                                                                        20

15       

                    2015                                                  

Gen Braga MD                                        

          [employer] 

REFILL                                  [id number]    

         

                                                                        20

15       

                    2015                                                  

Gen Braga MD                                        

          [facility] 

[facility]                                  [id number]    

         

                                                                        20

15       

                    2015                                                  

Gen Braga MD                                        

          [facility] 

[facility]FI[facility]                                  [id number]    

         

                                                                        20

15       

                    2015                                                  

Gen Braga MD                                        

          [facility] 

[facility]FI[facility]                                  [id number]    

         

                                                                        20

15       

                    2015                                                  

Gen Braga MD                                        

          [facility] 

[facility]FI[facility]                                  [id number]    

         

                                                                        20

15       

                    2015                                                  

Gen Braga MD                                        

          [facility] 

[facility]FI[facility]                                  [id number]    

         

                                                                        20

15       

                    2015                                                  

Gen Braga MD                                        

          [facility] 

[facility]FI[facility]                                  [id number]    

         

                                                                        20

15       

                    2015                                                  

MD KAVITHA McCO 

REFILL                                  [id number]    

         

                                                                        20

15       

                    2015                                                  

Gen Braga MD                                        

          [facility] 

[facility]FI[facility]                                  [id number]    

         

                                                                        20

15       

                    2015                                                  

Gen Braga MD                                        

          [facility] 

[facility]FI[facility]                                  [id number]    

         

                                                                        20

15       

                    2015                                                  

Gen Braga MD                                        

          [facility] 

[facility]FI[facility]                                  [id number]    

         

                                                                        20

15       

                    2015                                                  

Gen Braga MD                                        

          [facility] 

[facility]FI[facility]                                  [id number]    

         

                                                                        20

15       

                    2015                                                  

Gen Braga MD                                        

          [facility] 

[facility]FI[facility]                                  [id number]    

         

                                                                        20

15       

                    2015                                                  

Gen Braga MD                                        

          Unknown   

                                        [id number]    

                   

                                                                        20

15                 

                    2015                                                  

Gen Braga MD                                        

          Unknown   

                                        [id number]    

                   

                                                                        20

15                 

                    2015                                                  

Gen Braga MD                                        

          Unknown   

                                        [id number]    

                   

                                                                        20

15                 

                    2015                                                  

Gen Braga MD                                        

          Unknown   

                                        [id number]    

                   

                                                                        20

15                 

                    2015                                                  

Gen Braga MD                                        

          Unknown   

                                        [id number]    

                   

                                                                        20

15                 

                    2015                                                  

Gen Braga MD                                        

          Unknown   

                                        [id number]    

                   

                                                                        20

15                 

                    2015                                                  

Gen Braga MD                                        

          Unknown   

                                        [id number]    

                   

                                                                        20

15                 

                    2015                                                  

Gen Braga MD                                        

          Unknown   

                                        [id number]    

                   

                                                                        20

15                 

                    2015                                                  

Gen Braga MD                                        

          Unknown   

                                        [id number]    

                   

                                                                        20

15                 

                    2015                                                  

Gen Braga MD                                        

          Unknown   

                                        [id number]    

                   

                                                                        20

15                 

                    2015                                                  

Gen Braga MD                                        

          Unknown   

                                        [id number]    

                   

                                                                        20

15                 

                    2015                                                  

Gen Braga MD                                        

          Unknown   

                                        [id number]    

                   

                                                                        20

15                 

                    2015                                                  

Gen Braga MD                                        

          Unknown   

                                        [id number]    

                   

                                                                        20

15                 

                    2015                                                  

Gen Braga MD                                        

          Unknown   

                                        [id number]    

                   

                                                                        20

15                 

                    2015                                                  

Gen Braga MD                                        

          Unknown   

                                        [id number]    

                   

                                                                        20

15                 

                    2015                                                  

Gen Braga MD                                        

          [facility] 

[facility]FI[facility]                                  [id number]    

         

                                                                        20

15       

                    2015                                                  

Gen Braga MD                                        

          [facility] 

[facility]FI[facility]                                  [id number]    

         

                                                                        20

15       

                    2015                                                  

Gen Braga MD                                        

          [facility] 

[facility]FI[facility]                                  [id number]    

         

                                                                        20

15       

                    2015                                                  

Gen Braga MD                                        

          [facility] 

[facility]FI[facility]                                  [id number]    

         

                                                                        20

15       

                    2015                                                  

Gen Braga MD                                        

          [facility] 

[facility]FI[facility]                                  [id number]    

         

                                                                        20

15       

                    2015                                                  

Gen Braga MD                                        

          [facility] 

[facility]FI[facility]                                  [id number]    

         

                                                                        20

15       

                    2015                                                  

Gen Braga MD                                        

          [facility] 

[facility]FI[facility]                                  [id number]    

         

                                                                        20

15       

                    2015                                                  

Gen Braga MD                                        

          [facility] 

[facility]FI[facility]                                  [id number]    

         

                                                                        20

15       

                    2015                                                  

Gen Braga MD                                        

          [facility] 

[facility]FI[facility]                                  [id number]    

         

                                                                        20

15       

                    2015                                                  

Gen Braga MD                                        

          [facility] 

[facility]FI[facility]                                  [id number]    

         

                                                                        20

15       

                    2015                                                  

Gen Braga MD                                        

          [facility] 

[facility]FI[facility]                                  [id number]    

         

                                                                        20

15       

                    2015                                                  

Gen Braga MD                                        

          [facility] 

[facility]FILL                                  [id number]    

         

                                                                        20

15       

                    2015                                                  

Gen Braga MD                                        

          [facility] 

REFILL                                  [id number]    

         

                                                                        20

15       

                    2015                                                  

Gen Braga MD                                        

          [facility] 

[facility]FILL                                  [id number]    

         

                                                                        20

15       

                    2015                                                  

Gen Braga MD                                        

          [facility] 

[facility]FI[facility]                                  [id number]    

         

                                                                        20

15       

                    2015                                                  

Gen Braga MD                                        

          [facility] 

[facility]FI[facility]                                  [id number]    

         

                                                                        20

15       

                    2015                                                  

Gen Braga MD                                        

          [facility] 

[facility]FI[facility]                                  [id number]    

         

                                                                        20

15       

                    2015                                                  

Gen Braga MD                                        

          [facility] 

[facility]FI[facility]                                  [id number]    

         

                                                                        20

15       

                    2015                                                  

Gen Braga MD                                        

          [facility] 

[facility]FI[facility]                                  [id number]    

         

                                                                        20

15       

                    2015                                                  

[facility] Braga MD                                        

          [facility]  

                                        [id number]    

                  

                                                                        20

15                

                    2015                                                  

Gne Braga MD                                        

          [facility]/PsA  

                                        [id number]    

                  

                                                                        20

15                

                    2015                                                  

Gen Braga MD                                        

          [facility]  

                                        [id number]    

                  

                                                                        20

15                

                    2015                                                  

Gen Braga MD                                        

          [facility]Ps[facility]  

                                        [id number]    

                  

                                                                        20

15                

                    2015                                                  

Gen Braga MD                                        

          [facility]  

                                        [id number]    

                  

                                                                        20

15                

                    2015                                                  

Gen Braga MD                                        

          [facility]PsA  

                                        [id number]    

                  

                                                                        20

15                

                    2015                                                  

Gen Braga MD                                        

          [facility]PsA  

                                        [id number]    

                  

                                                                        20

15                

                    2015                                                  

Gen Braga MD                                        

          [facility]PsA  

                                        [id number]    

                  

                                                                        20

15                

                    2015                                                  

Gen Braga MD                                        

          [facility]  

                                        [id number]    

                  

                                                                        20

15                

                    2015                                                  

Gen Braga MD                                        

          [facility]PsA  

                                        [id number]    

                  

                                                                        20

15                

                    2015                                                  

Gen Braga MD                                        

          [facility]PsA  

                                        [id number]    

                  

                                                                        20

15                

                    2015                                                  

Gen Braga MD                                        

          [facility]PsA  

                                        [id number]    

                  

                                                                        20

15                

                    2015                                                  

Gen Braga MD                                        

          [facility]PsA  

                                        [id number]    

                  

                                                                        20

15                

                    2015                                                  

Gen Braga MD                                        

          [facility]  

                                        [id number]    

                  

                                                                        20

15                

                    2015                                                  

eGn Braga MD                                        

          [facility]  

                                        [id number]    

                  

                                                                        20

15                

                    2015                                                  

Gen Braga MD                                        

          Unknown   

                                        [id number]    

                   

                                                                        20

15                 

                    2015                                                  

Gen Braga MD                                        

          Unknown   

                                        [id number]    

                   

                                                                        20

15                 

                    2015                                                  

Gen Braga MD                                        

          Unknown   

                                        [id number]    

                   

                                                                        20

15                 

                    2015                                                  

Gen Braga MD                                        

          Unknown   

                                        [id number]    

                   

                                                                        20

15                 

                    2015                                                  

Gen Braga MD                                        

          Unknown   

                                        [id number]    

                   

                                                                        20

15                 

                    2015                                                  

Gen Braga MD                                        

          Unknown   

                                        [id number]    

                   

                                                                        20

15                 

                    2015                                                  

Gen Braga MD                                        

          Unknown   

                                        [id number]    

                   

                                                                        20

15                 

                    2015                                                  

Gen Braga MD                                        

          Unknown   

                                        [id number]    

                   

                                                                        20

15                 

                    2015                                                  

Gen Braga MD                                        

          Unknown   

                                        [id number]    

                   

                                                                        20

15                 

                    2015                                                  

Gen Braga MD                                        

          Unknown   

                                        [id number]    

                   

                                                                        20

15                 

                    2015                                                  

Gen Braga MD                                        

          Unknown   

                                        [id number]    

                   

                                                                        20

15                 

                    2015                                                  

Gen Braga MD                                        

          Unknown   

                                        [id number]    

                   

                                                                        20

15                 

                    2015                                                  

Gen Braga MD                                        

          Unknown   

                                        [id number]    

                   

                                                                        20

15                 

                    2015                                                  

Gen Braga MD                                        

          Unknown   

                                        [id number]    

                   

                                                                        20

15                 

                    2015                                                  

Gen Braga MD                                        

          Unknown   

                                        [id number]    

                   

                                                                        20

15                 

                    2015                                                  

Gen Braga MD                                        

          Unknown   

                                        [id number]    

                   

                                                                        20

15                 

                    2015                                                  

Gen Braga MD                                        

          Unknown   

                                        [id number]    

                   

                                                                        20

15                 

                    2015                                                  

Gen Braga MD                                        

          Unknown   

                                        [id number]    

                   

                                                                        20

15                 

                    2015                                                  

Gen Braga MD                                        

          Unknown   

                                        [id number]    

                   

                                                                        20

15                 

                    2015                                                  

Gen Braga MD                                        

          RHBE/PsA  

                                        [id number]    

                  

                                                                        20

15                

                    2015                                                  

Gen Braga MD                                        

          RHBE/PsA  

                                        [id number]    

                  

                                                                        20

15                

                    2015                                                  

Gen Braga MD                                        

          RHBE/PsA  

                                        [id number]    

                  

                                                                        20

15                

                    2015                                                  

Gen Braga MD                                        

          RHBE/PsA  

                                        [id number]    

                  

                                                                        20

15                

                    2015                                                  

Gen Braga MD                                        

          RHBE/PsA  

                                        [id number]    

                  

                                                                        20

15                

                    2015                                                  

Gen Braga MD                                        

          RHBE/PsA  

                                        [id number]    

                  

                                                                        20

15                

                    2015                                                  

Gen Braga MD                                        

          RHBE/PsA  

                                        [id number]    

                  

                                                                        20

15                

                    2015                                                  

Gen Braga MD                                        

          RHBE/PsA  

                                        [id number]    

                  

                                                                        20

15                

                    2015                                                  

Gen Braga MD                                        

          RHBE/PsA  

                                        [id number]    

                  

                                                                        20

15                

                    2015                                                  

Gen Braga MD                                        

          RHBE/PsA  

                                        [id number]    

                  

                                                                        20

15                

                    2015                                                  

Gen Braga MD                                        

          RHBE/PsA  

                                        [id number]    

                  

                                                                        20

15                

                    2015                                                  

Gen Braga MD                                        

          RHBE/PsA  

                                        [id number]    

                  

                                                                        20

15                

                    2015                                                  

Gen Braga MD                                        

          RHBE/PsA  

                                        [id number]    

                  

                                                                        20

15                

                    2015                                                  

Gen Braga MD                                        

          RHBE/PsA  

                                        [id number]    

                  

                                                                        20

15                

                    2015                                                  

Gen Braga MD                                        

          [facility]  

                                        [id number]    

                  

                                                                        20

15                

                    2015                                                  

Gen Braga MD                                        

          RHBE/PsA  

                                        [id number]    

                  

                                                                        20

15                

                    2015                                                  

Gen Braga MD                                        

          [facility]  

                                        [id number]    

                  

                                                                        20

15                

                    2015                                                  

Gen Braga MD                                        

          RHBE/PsA  

                                        [id number]    

                  

                                                                        20

15                

                    2015                                                  

Gen Braga MD                                        

          RHBE/PsA  

                                        [id number]    

                  

                                                                        20

15                

                    2015                                                  

eGn Braga MD                                        

          Unknown   

                                        [id number]    

                   

                                                                        20

15                 

                    2015                                                  

Gen Braga MD                                        

          Unknown   

                                        [id number]    

                   

                                                                        20

15                 

                    2015                                                  

Gen Braga MD                                        

          Unknown   

                                        [id number]    

                   

                                                                        20

15                 

                    2015                                                  

Gen Braga MD                                        

          Unknown   

                                        [id number]    

                   

                                                                        20

15                 

                    2015                                                  

Gen Braga MD                                        

          Unknown   

                                        [id number]    

                   

                                                                        20

15                 

                    2015                                                  

Gen Braga MD                                        

          Unknown   

                                        [id number]    

                   

                                                                        20

15                 

                    2015                                                  

Gen Braga MD                                        

          Unknown   

                                        [id number]    

                   

                                                                        20

15                 

                    2015                                                  

Gen Braga MD                                        

          Unknown   

                                        [id number]    

                   

                                                                        20

15                 

                    2015                                                  

Gen Braga MD                                        

          Unknown   

                                        [id number]    

                   

                                                                        20

15                 

                    2015                                                  

Gen Braga MD                                        

          Unknown   

                                        [id number]    

                   

                                                                        20

15                 

                    2015                                                  

Gen Braga MD                                        

          Unknown   

                                        [id number]    

                   

                                                                        20

15                 

                    2015                                                  

Gen Braga MD                                        

          Unknown   

                                        [id number]    

                   

                                                                        20

15                 

                    2015                                                  

Gen Braga MD                                        

          Unknown   

                                        [id number]    

                   

                                                                        20

15                 

                    2015                                                  

Gen Braga MD                                        

          Unknown   

                                        [id number]    

                   

                                                                        20

15                 

                    2015                                                  

Gen Braga MD                                        

          Unknown   

                                        [id number]    

                   

                                                                        20

15                 

                    2015                                                  

Gen Braga MD                                        

          Unknown   

                                        [id number]    

                   

                                                                        20

15                 

                    2015                                                  

Gen Braga MD                                        

          Unknown   

                                        [id number]    

                   

                                                                        20

15                 

                    2015                                                  

Gen Braga MD                                        

          Unknown   

                                        [id number]    

                   

                                                                        20

15                 

                    2015                                                  

Gen Braga MD                                        

          Unknown   

                                        [id number]    

                   

                                                                        20

15                 

                    2015                                                  

Gen Braga MD                                        

          Unknown   

                                        [id number]    

                   

                                                                        20

15                 

                    2015                                                  

Gen Braga MD                                        

          Unknown   

                                        [id number]    

                   

                                                                        20

15                 

                    2015                                                  

Gen Braga MD                                        

          Unknown   

                                        [id number]    

                   

                                                                        20

15                 

                    2015                                                  

Gen Braga MD                                        

          Unknown   

                                        [id number]    

                   

                                                                        20

15                 

                    2015                                                  

Gen Braga MD                                        

          Unknown   

                                        [id number]    

                   

                                                                        20

15                 

                    2015                                                  

Gen Braga MD                                        

          Unknown   

                                        [id number]    

                   

                                                                        20

15                 

                    2015                                                  

Gen Braga MD                                        

          Unknown   

                                        [id number]    

                   

                                                                        20

15                 

                    2015                                                  

Gen Braga MD                                        

          Unknown   

                                        [id number]    

                   

                                                                        20

15                 

                    2015                                                  

Gen Braga MD                                        

          Unknown   

                                        [id number]    

                   

                                                                        20

15                 

                    2015                                                  

Gen Braga MD                                        

          Unknown   

                                        [id number]    

                   

                                                                        20

15                 

                    2015                                                  

Gen Braga MD                                        

          Unknown   

                                        [id number]    

                   

                                                                        20

15                 

                    2015                                                  

Gen Braga MD                                        

          Unknown   

                                        [id number]    

                   

                                                                        20

15                 

                    2015                                                  

Gen Braga MD                                        

          Unknown   

                                        [id number]    

                   

                                                                        20

15                 

                    2015                                                  

Gen Braga MD                                        

          Unknown   

                                        [id number]    

                   

                                                                        20

15                 

                    2015                                                  

Gen Braga MD                                        

          Unknown   

                                        [id number]    

                   

                                                                        20

15                 

                    2015                                                  

Gen Braga MD                                        

          [facility] FU  

                                        [id number]    

                  

                                                                        10/29/20

15                

                    10/29/2015                                                  

Gen Braga MD                                        

          [facility] FU  

                                        [id number]    

                  

                                                                        10/29/20

15                

                    10/29/2015                                                  

Gen Braga MD                                        

          [facility]U  

                                        [id number]    

                  

                                                                        10/29/20

15                

                    10/29/2015                                                  

Gen Braga MD                                        

          [facility]  

                                        [id number]    

                  

                                                                        10/29/20

15                

                    10/29/2015                                                  

Gen Braga MD                                        

          [facility]U  

                                        [id number]    

                  

                                                                        10/29/20

15                

                    10/29/2015                                                  

Gen Braga MD                                        

          [facility]  

                                        [id number]    

                  

                                                                        10/29/20

15                

                    10/29/2015                                                  

Gen Braga MD                                        

          [facility]  

                                        [id number]    

                  

                                                                        10/29/20

15                

                    10/29/2015                                                  

Gen Braga MD                                        

          [facility]  

                                        [id number]    

                  

                                                                        10/29/20

15                

                    10/29/2015                                                  

Gen Braga MD                                        

          [facility]  

                                        [id number]    

                  

                                                                        10/29/20

15                

                    10/29/2015                                                  

Gen Braga MD                                        

          [facility]  

                                        [id number]    

                  

                                                                        10/29/20

15                

                    10/29/2015                                                  

Gen Braga MD                                        

          [facility]  

                                        [id number]    

                  

                                                                        10/29/20

15                

                    10/29/2015                                                  

Gen Braga MD                                        

          [facility]  

                                        [id number]    

                  

                                                                        10/29/20

15                

                    10/29/2015                                                  

Gen Braga MD                                        

          [facility]  

                                        [id number]    

                  

                                                                        10/29/20

15                

                    10/29/2015                                                  

Gen Braga MD                                        

          [facility]  

                                        [id number]    

                  

                                                                        10/29/20

15                

                    10/29/2015                                                  

Gen Braga MD                                        

          [facility]  

                                        [id number]    

                  

                                                                        10/29/20

15                

                    10/29/2015                                                  

Gen Braga MD                                        

          [facility]  

                                        [id number]    

                  

                                                                        10/29/20

15                

                    10/29/2015                                                  

Gen Braga MD                                        

          [facility]  

                                        [id number]    

                  

                                                                        10/29/20

15                

                    10/29/2015                                                  

Gen Braga MD                                        

          [facility]  

                                        [id number]    

                  

                                                                        10/29/20

15                

                    10/29/2015                                                  

Gen Braga MD                                        

          [facility]  

                                        [id number]    

                  

                                                                        10/29/20

15                

                    10/29/2015                                                  

Gen Braga MD                                        

          [facility]  

                                        [id number]    

                  

                                                                        10/29/20

15                

                    10/29/2015                                                  

Gen Braga MD                                        

          [facility]  

                                        [id number]    

                  

                                                                        10/29/20

15                

                    10/29/2015                                                  

Gen Braga MD                                        

          [facility]  

                                        [id number]    

                  

                                                                        10/29/20

15                

                    10/29/2015                                                  

Gen Braga MD                                        

          [facility]  

                                        [id number]    

                  

                                                                        10/29/20

15                

                    10/29/2015                                                  

Gen Braga MD                                        

          [facility]  

                                        [id number]    

                  

                                                                        10/29/20

15                

                    10/29/2015                                                  

Gen Braga MD                                        

          [facility]  

                                        [id number]    

                  

                                                                        10/29/20

15                

                    10/29/2015                                                  

Gen Braga MD                                        

          [facility]  

                                        [id number]    

                  

                                                                        10/29/20

15                

                    10/29/2015                                                  

Gen Braga MD                                        

          [facility]  

                                        [id number]    

                  

                                                                        10/29/20

15                

                    10/29/2015                                                  

Gen Braga MD                                        

          [facility]  

                                        [id number]    

                  

                                                                        10/29/20

15                

                    10/29/2015                                                  

Gen Braga MD                                        

          [facility]  

                                        [id number]    

                  

                                                                        10/29/20

15                

                    10/29/2015                                                  

Gen Braga MD                                        

          [facility]  

                                        [id number]    

                  

                                                                        10/29/20

15                

                    10/29/2015                                                  

Gen Braga MD                                        

          [facility]  

                                        [id number]    

                  

                                                                        10/29/20

15                

                    10/29/2015                                                  

Gen Braga MD                                        

          [facility]  

                                        [id number]    

                  

                                                                        10/29/20

15                

                    10/29/2015                                                  

Gen Braga MD                                        

          [facility]  

                                        [id number]    

                  

                                                                        10/29/20

15                

                    10/29/2015                                                  

Gen Braga MD                                        

          [facility]  

                                        [id number]    

                  

                                                                        10/29/20

15                

                    10/29/2015                                                  

Gen Braga MD                                        

          [facility] 

                                        [id number]    

                 

                                                                        20

15               

                    2015                                                  

Gen Braga MD                                        

          [facility] 

                                        [id number]    

                 

                                                                        20

15               

                    2015                                                  

Gen Braga MD                                        

          [facility] 

                                        [id number]    

                 

                                                                        20

15               

                    2015                                                  

Gen Braga MD                                        

          [facility] 

                                        [id number]    

                 

                                                                        20

15               

                    2015                                                  

Gen Braga MD                                        

          [facility] 

                                        [id number]    

                 

                                                                        20

15               

                    2015                                                  

Gen Braga MD                                        

          [facility] 

                                        [id number]    

                 

                                                                        20

15               

                    2015                                                  

Gen Braga MD                                        

          [facility] 

                                        [id number]    

                 

                                                                        20

15               

                    2015                                                  

Gen Braga MD                                        

          [facility] 

                                        [id number]    

                 

                                                                        20

15               

                    2015                                                  

Gen Braga MD                                        

          [facility] 

                                        [id number]    

                 

                                                                        20

15               

                    2015                                                  

Gen Braga MD                                        

          [facility] 

                                        [id number]    

                 

                                                                        20

15               

                    2015                                                  

Gen Braga MD                                        

          [facility] 

                                        [id number]    

                 

                                                                        20

15               

                    2015                                                  

Gen Braga MD                                        

          [facility] 

                                        [id number]    

                 

                                                                        20

15               

                    2015                                                  

Gen Braga MD                                        

          [facility] 

                                        [id number]    

                 

                                                                        20

15               

                    2015                                                  

Gen Braga MD                                        

          [facility] 

                                        [id number]    

                 

                                                                        20

15               

                    2015                                                  

Gen Braga MD                                        

          [facility] 

                                        [id number]    

                 

                                                                        20

15               

                    2015                                                  

Gen Braga MD                                        

          [facility] 

                                        [id number]    

                 

                                                                        20

15               

                    2015                                                  

Gen Braga MD                                        

          [facility] 

                                        [id number]    

                 

                                                                        20

15               

                    2015                                                  

Gen Braga MD                                        

          [facility] 

                                        [id number]    

                 

                                                                        20

15               

                    2015                                                  

Gen Braga MD                                        

          [facility] 

                                        [id number]    

                 

                                                                        20

15               

                    2015                                                  

Gen Braga MD                                        

          [facility] 

                                        [id number]    

                 

                                                                        20

15               

                    2015                                                  

Gen Braga MD                                        

          [facility] 

                                        [id number]    

                 

                                                                        20

15               

                    2015                                                  

Gen Braga MD                                        

          [facility] 

                                        [id number]    

                 

                                                                        20

15               

                    2015                                                  

Gen Braga MD                                        

          [facility] 

                                        [id number]    

                 

                                                                        20

15               

                    2015                                                  

Gen Braga MD                                        

          [facility] 

                                        [id number]    

                 

                                                                        20

15               

                    2015                                                  

Gen Braga MD                                        

          [facility] 

                                        [id number]    

                 

                                                                        20

15               

                    2015                                                  

Gen Braga MD                                        

          [facility] 

                                        [id number]    

                 

                                                                        20

15               

                    2015                                                  

Gen Braga MD                                        

          [facility] 

                                        [id number]    

                 

                                                                        20

15               

                    2015                                                  

Gen Braga MD                                        

          [facility] 

                                        [id number]    

                 

                                                                        20

15               

                    2015                                                  

Gen Braga MD                                        

          [facility] 

                                        [id number]    

                 

                                                                        20

15               

                    2015                                                  

Gen Braga MD                                        

          [facility] 

                                        [id number]    

                 

                                                                        20

15               

                    2015                                                  

Gen Braga MD                                        

          [facility] 

                                        [id number]    

                 

                                                                        20

15               

                    2015                                                  

Gen Braga MD                                        

          [facility] 

                                        [id number]    

                 

                                                                        20

15               

                    2015                                                  

Gen Braga MD                                        

          [facility] 

                                        [id number]    

                 

                                                                        20

15               

                    2015                                                  

Gen Braga MD                                        

          folic acid

refill                                  [id number]    

         

                                                                        20

15       

                    2015                                                  

Gen Braga MD                                        

          folic acid

refill                                  [id number]    

         

                                                                        20

15       

                    2015                                                  

Gen Braga MD                                        

          folic acid

refill                                  [id number]    

         

                                                                        20

15       

                    2015                                                  

Gen Braga MD                                        

          folic acid

refill                                  [id number]    

         

                                                                        20

15       

                    2015                                                  

Gen Braga MD                                        

          folic acid

refill                                  [id number]    

         

                                                                        20

15       

                    2015                                                  

Gen Braga MD                                        

          folic acid

refill                                  [id number]    

         

                                                                        20

15       

                    2015                                                  

Gen Braga MD                                        

          folic acid

refill                                  [id number]    

         

                                                                        20

15       

                    2015                                                  

Gen Braga MD                                        

          folic acid

refill                                  [id number]    

         

                                                                        20

15       

                    2015                                                  

Gen Braga MD                                        

          folic acid

refill                                  [id number]    

         

                                                                        20

15       

                    2015                                                  

Gen Braga MD                                        

          folic acid

refill                                  [id number]    

         

                                                                        20

15       

                    2015                                                  

Gen Braga MD                                        

          folic acid

refill                                  [id number]    

         

                                                                        20

15       

                    2015                                                  

Gen Braga MD                                        

          folic acid

refill                                  [id number]    

         

                                                                        20

15       

                    2015                                                  

Gen Braga MD                                        

          folic acid

refill                                  [id number]    

         

                                                                        20

15       

                    2015                                                  

Gen Braga MD                                        

          folic acid

refill                                  [id number]    

         

                                                                        20

15       

                    2015                                                  

Gen Braga MD                                        

          folic acid

refill                                  [id number]    

         

                                                                        20

15       

                    2015                                                  

Gen Braga MD                                        

          folic acid

refill                                  [id number]    

         

                                                                        20

15       

                    2015                                                  

Gen Braga MD                                        

          folic acid

refill                                  [id number]    

         

                                                                        20

15       

                    2015                                                  

Gen Braga MD                                        

          folic acid

refill                                  [id number]    

         

                                                                        20

15       

                    2015                                                  

Gen Braga MD                                        

          folic acid

refill                                  [id number]    

         

                                                                        20

15       

                    2015                                                  

Gen Braga MD                                        

          folic acid

refill                                  [id number]    

         

                                                                        20

15       

                    2015                                                  

Gen Braga MD                                        

          folic acid

refill                                  [id number]    

         

                                                                        20

15       

                    2015                                                  

Gen Braga MD                                        

          folic acid

refill                                  [id number]    

         

                                                                        20

15       

                    2015                                                  

Gen Braga MD                                        

          folic acid

refill                                  [id number]    

         

                                                                        20

15       

                    2015                                                  

Gen Braga MD                                        

          folic acid

refill                                  [id number]    

         

                                                                        20

15       

                    2015                                                  

Gen Braga MD                                        

          folic acid

refill                                  [id number]    

         

                                                                        20

15       

                    2015                                                  

Gen Braga MD                                        

          folic acid

refill                                  [id number]    

         

                                                                        20

15       

                    2015                                                  

Gen Braga MD                                        

          folic acid

refill                                  [id number]    

         

                                                                        20

15       

                    2015                                                  

Gen Braga MD                                        

          folic acid

refill                                  [id number]    

         

                                                                        20

15       

                    2015                                                  

Gen Braga MD                                        

          folic acid

refill                                  [id number]    

         

                                                                        20

15       

                    2015                                                  

Gen Braga MD                                        

          folic acid

refill                                  [id number]    

         

                                                                        20

15       

                    2015                                                  

Gen Braga MD                                        

          folic acid

refill                                  [id number]    

         

                                                                        20

15       

                    2015                                                  

Gen Braga MD                                        

          folic acid

refill                                  [id number]    

         

                                                                        20

15       

                    2015                                                  

Gen Braga MD                                        

          folic acid

refill                                  [id number]    

         

                                                                        20

15       

                    2015                                                  

Gen Braga MD                                        

          ENBREL 

REFILL                                  [id number]    

         

                                                                        20

15       

                    2015                                                  

Gen Braga MD                                        

          ENBREL 

REFILL                                  [id number]    

         

                                                                        20

15       

                    2015                                                  

Gen Braga MD                                        

          ENBREL 

REFILL                                  [id number]    

         

                                                                        20

15       

                    2015                                                  

Gen Braga MD                                        

          ENBREL 

REFILL                                  [id number]    

         

                                                                        20

15       

                    2015                                                  

Gen Braga MD                                        

          ENBREL 

REFILL                                  [id number]    

         

                                                                        20

15       

                    2015                                                  

Gen Braga MD                                        

          ENBREL 

REFILL                                  [id number]    

         

                                                                        20

15       

                    2015                                                  

Gen Braga MD                                        

          ENBREL 

REFILL                                  [id number]    

         

                                                                        20

15       

                    2015                                                  

Gen Braga MD                                        

          ENBREL 

REFILL                                  [id number]    

         

                                                                        20

15       

                    2015                                                  

Gen Braga MD                                        

          ENBREL 

REFILL                                  [id number]    

         

                                                                        20

15       

                    2015                                                  

Gen Braga MD                                        

          ENBREL 

REFILL                                  [id number]    

         

                                                                        20

15       

                    2015                                                  

Gen Braga MD                                        

          ENBREL 

REFILL                                  [id number]    

         

                                                                        20

15       

                    2015                                                  

Gen Braga MD                                        

          ENBREL 

REFILL                                  [id number]    

         

                                                                        20

15       

                    2015                                                  

Gen Braga MD                                        

          ENBREL 

REFILL                                  [id number]    

         

                                                                        20

15       

                    2015                                                  

Gen Braga MD                                        

          ENBREL 

REFILL                                  [id number]    

         

                                                                        20

15       

                    2015                                                  

Gen Braga MD                                        

          ENBREL 

REFILL                                  [id number]    

         

                                                                        20

15       

                    2015                                                  

Gen Braga MD                                        

          ENBREL 

REFILL                                  [id number]    

         

                                                                        20

15       

                    2015                                                  

Gen Braga MD                                        

          ENBREL 

REFILL                                  [id number]    

         

                                                                        20

15       

                    2015                                                  

Gen Braga MD                                        

          ENBREL 

REFILL                                  [id number]    

         

                                                                        20

15       

                    2015                                                  

Gen Braga MD                                        

          ENBREL 

REFILL                                  [id number]    

         

                                                                        20

15       

                    2015                                                  

Gen Braga MD                                        

          ENBREL 

REFILL                                  [id number]    

         

                                                                        20

15       

                    2015                                                  

Gen Braga MD                                        

          ENBREL 

REFILL                                  [id number]    

         

                                                                        20

15       

                    2015                                                  

Gen Braga MD                                        

          ENBREL 

REFILL                                  [id number]    

         

                                                                        20

15       

                    2015                                                  

Gen Braga MD                                        

          ENBREL 

REFILL                                  [id number]    

         

                                                                        20

15       

                    2015                                                  

Gen Braga MD                                        

          ENBREL 

REFILL                                  [id number]    

         

                                                                        20

15       

                    2015                                                  

eGn Braga MD                                        

          ENBREL 

REFILL                                  [id number]    

         

                                                                        20

15       

                    2015                                                  

Gen Braga MD                                        

          ENBREL 

REFILL                                  [id number]    

         

                                                                        20

15       

                    2015                                                  

Gen Braga MD                                        

          ENBREL 

REFILL                                  [id number]    

         

                                                                        20

15       

                    2015                                                  

Gen Braga MD                                        

          ENBREL 

REFILL                                  [id number]    

         

                                                                        20

15       

                    2015                                                  

Gen Braga MD                                        

          ENBREL 

REFILL                                  [id number]    

         

                                                                        20

15       

                    2015                                                  

Gen Braga MD                                        

          ENBREL 

REFILL                                  [id number]    

         

                                                                        20

15       

                    2015                                                  

Gen Braga MD                                        

          ENBREL 

REFILL                                  [id number]    

         

                                                                        20

15       

                    2015                                                  

Gen Braga MD                                        

          Unknown   

                                        [id number]    

                   

                                                                        20

15                 

                    2015                                                  

Gen Braga MD                                        

          Unknown   

                                        [id number]    

                   

                                                                        20

15                 

                    2015                                                  

Gen Braga MD                                        

          Unknown   

                                        [id number]    

                   

                                                                        20

15                 

                    2015                                                  

Gen Braga MD                                        

          Unknown   

                                        [id number]    

                   

                                                                        20

15                 

                    2015                                                  

Gen Braga MD                                        

          Unknown   

                                        [id number]    

                   

                                                                        20

15                 

                    2015                                                  

Gen Braga MD                                        

          Unknown   

                                        [id number]    

                   

                                                                        20

15                 

                    2015                                                  

Gen Braga MD                                        

          Unknown   

                                        [id number]    

                   

                                                                        20

15                 

                    2015                                                  

Gen Braga MD                                        

          Unknown   

                                        [id number]    

                   

                                                                        20

15                 

                    2015                                                  

Gen Braga MD                                        

          Unknown   

                                        [id number]    

                   

                                                                        20

15                 

                    2015                                                  

Gen Braga MD                                        

          Unknown   

                                        [id number]    

                   

                                                                        20

15                 

                    2015                                                  

Gen Braga MD                                        

          Unknown   

                                        [id number]    

                   

                                                                        20

15                 

                    2015                                                  

Gen Braga MD                                        

          Unknown   

                                        [id number]    

                   

                                                                        20

15                 

                    2015                                                  

Gen Braga MD                                        

          Unknown   

                                        [id number]    

                   

                                                                        20

15                 

                    2015                                                  

Gen Braga MD                                        

          Unknown   

                                        [id number]    

                   

                                                                        20

15                 

                    2015                                                  

Gen Braga MD                                        

          Unknown   

                                        [id number]    

                   

                                                                        20

15                 

                    2015                                                  

Gen Braga MD                                        

          Unknown   

                                        [id number]    

                   

                                                                        20

15                 

                    2015                                                  

Gen Braga MD                                        

          Unknown   

                                        [id number]    

                   

                                                                        20

15                 

                    2015                                                  

Gen Braga MD                                        

          Unknown   

                                        [id number]    

                   

                                                                        20

15                 

                    2015                                                  

Gen Braga MD                                        

          Unknown   

                                        [id number]    

                   

                                                                        20

15                 

                    2015                                                  

Gen Braga MD                                        

          Unknown   

                                        [id number]    

                   

                                                                        20

15                 

                    2015                                                  

Gen Braga MD                                        

          Unknown   

                                        [id number]    

                   

                                                                        20

15                 

                    2015                                                  

Gen Braga MD                                        

          Unknown   

                                        [id number]    

                   

                                                                        20

15                 

                    2015                                                  

Gen Braga MD                                        

          Unknown   

                                        [id number]    

                   

                                                                        20

15                 

                    2015                                                  

Gen Braga MD                                        

          Unknown   

                                        [id number]    

                   

                                                                        20

15                 

                    2015                                                  

Gen Braga MD                                        

          Unknown   

                                        [id number]    

                   

                                                                        20

15                 

                    2015                                                  

Gen Braga MD                                        

          Unknown   

                                        [id number]    

                   

                                                                        20

15                 

                    2015                                                  

Gen Braga MD                                        

          Unknown   

                                        [id number]    

                   

                                                                        20

15                 

                    2015                                                  

Gen Braga MD                                        

          Unknown   

                                        [id number]    

                   

                                                                        20

15                 

                    2015                                                  

Gen Braga MD                                        

          Unknown   

                                        [id number]    

                   

                                                                        20

15                 

                    2015                                                  

Gen Braga MD                                        

          Unknown   

                                        [id number]    

                   

                                                                        20

15                 

                    2015                                                  

Gen Braga MD                                        

          Unknown   

                                        [id number]    

                   

                                                                        20

15                 

                    2015                                                  

Gen Braga MD                                        

          ENBREL    

                                        [id number]    

                    

                                                                        20

15                  

                    2015                                                  

Gen Braga MD                                        

          ENBREL    

                                        [id number]    

                    

                                                                        20

15                  

                    2015                                                  

Gen Braga MD                                        

          ENBREL    

                                        [id number]    

                    

                                                                        20

15                  

                    2015                                                  

Gen Braga MD                                        

          ENBREL    

                                        [id number]    

                    

                                                                        20

15                  

                    2015                                                  

Gen Braga MD                                        

          ENBREL    

                                        [id number]    

                    

                                                                        20

15                  

                    2015                                                  

Gen Braga MD                                        

          ENBREL    

                                        [id number]    

                    

                                                                        20

15                  

                    2015                                                  

Gen Braga MD                                        

          ENBREL    

                                        [id number]    

                    

                                                                        20

15                  

                    2015                                                  

Gen Braga MD                                        

          ENBREL    

                                        [id number]    

                    

                                                                        20

15                  

                    2015                                                  

Gen Braga MD                                        

          ENBREL    

                                        [id number]    

                    

                                                                        20

15                  

                    2015                                                  

Gen Braga MD                                        

          ENBREL    

                                        [id number]    

                    

                                                                        20

15                  

                    2015                                                  

Gen Braga MD                                        

          ENBREL    

                                        [id number]    

                    

                                                                        20

15                  

                    2015                                                  

Gen Braga MD                                        

          ENBREL    

                                        [id number]    

                    

                                                                        20

15                  

                    2015                                                  

Gen Braga MD                                        

          ENBREL    

                                        [id number]    

                    

                                                                        20

15                  

                    2015                                                  

Gen Braga MD                                        

          ENBREL    

                                        [id number]    

                    

                                                                        20

15                  

                    2015                                                  

Gen Braga MD                                        

          ENBREL    

                                        [id number]    

                    

                                                                        20

15                  

                    2015                                                  

Gen Braga MD                                        

          ENBREL    

                                        [id number]    

                    

                                                                        20

15                  

                    2015                                                  

Gen Braga MD                                        

          ENBREL    

                                        [id number]    

                    

                                                                        20

15                  

                    2015                                                  

Gen Braga MD                                        

          ENBREL    

                                        [id number]    

                    

                                                                        20

15                  

                    2015                                                  

Gen Braga MD                                        

          ENBREL    

                                        [id number]    

                    

                                                                        20

15                  

                    2015                                                  

Gen Braga MD                                        

          ENBREL    

                                        [id number]    

                    

                                                                        20

15                  

                    2015                                                  

Gen Braga MD                                        

          ENBREL    

                                        [id number]    

                    

                                                                        20

15                  

                    2015                                                  

Gen Braga MD                                        

          ENBREL    

                                        [id number]    

                    

                                                                        20

15                  

                    2015                                                  

Gen Braga MD                                        

          ENBREL    

                                        [id number]    

                    

                                                                        20

15                  

                    2015                                                  

Gen Braga MD                                        

          ENBREL    

                                        [id number]    

                    

                                                                        20

15                  

                    2015                                                  

Gen Braga MD                                        

          ENBREL    

                                        [id number]    

                    

                                                                        20

15                  

                    2015                                                  

Gen Braga MD                                        

          ENBREL    

                                        [id number]    

                    

                                                                        20

15                  

                    2015                                                  

Gen Braga MD                                        

          ENBREL    

                                        [id number]    

                    

                                                                        20

15                  

                    2015                                                  

Gen Braga MD                                        

          ENBREL    

                                        [id number]    

                    

                                                                        20

15                  

                    2015                                                  

Gen Braga MD                                        

          ENBREL    

                                        [id number]    

                    

                                                                        20

15                  

                    2015                                                  

Gen Braga MD                                        

          1 [facility]  

                                        [id number]    

                  

                                                                        20

15                

                    2015                                                  

Gen Braga MD                                        

          1 [facility]  

                                        [id number]    

                  

                                                                        20

15                

                    2015                                                  

Gen Braga MD                                        

          1 [facility]  

                                        [id number]    

                  

                                                                        20

15                

                    2015                                                  

Gen Braag MD                                        

          1 [facility]  

                                        [id number]    

                  

                                                                        20

15                

                    2015                                                  

Gen Braga MD                                        

          1 [facility]  

                                        [id number]    

                  

                                                                        20

15                

                    2015                                                  

Gen Braga MD                                        

          1 [facility]  

                                        [id number]    

                  

                                                                        20

15                

                    2015                                                  

Gen Braga MD                                        

          1 [facility]  

                                        [id number]    

                  

                                                                        20

15                

                    2015                                                  

Gen Braga MD                                        

          1 [facility]  

                                        [id number]    

                  

                                                                        20

15                

                    2015                                                  

Gen Braga MD                                        

          1 [facility]  

                                        [id number]    

                  

                                                                        20

15                

                    2015                                                  

Gen Braga MD                                        

          1 [facility]  

                                        [id number]    

                  

                                                                        20

15                

                    2015                                                  

Gen Braga MD                                        

          1 [facility]  

                                        [id number]    

                  

                                                                        20

15                

                    2015                                                  

Gen Braga MD                                        

          1 [facility]  

                                        [id number]    

                  

                                                                        20

15                

                    2015                                                  

Gen Braga MD                                        

          1 [facility]  

                                        [id number]    

                  

                                                                        20

15                

                    2015                                                  

Gen Braga MD                                        

          1 [facility]  

                                        [id number]    

                  

                                                                        20

15                

                    2015                                                  

Gen Braga MD                                        

          1 [facility]  

                                        [id number]    

                  

                                                                        20

15                

                    2015                                                  

Gen Braga MD                                        

          1 [facility]  

                                        [id number]    

                  

                                                                        20

15                

                    2015                                                  

Gen Braga MD                                        

          1 [facility]  

                                        [id number]    

                  

                                                                        20

15                

                    2015                                                  

Gen Braga MD                                        

          1 [facility]  

                                        [id number]    

                  

                                                                        20

15                

                    2015                                                  

Gen Braga MD                                        

          1 [facility]  

                                        [id number]    

                  

                                                                        20

15                

                    2015                                                  

Gen Braga MD                                        

          1 [facility]  

                                        [id number]    

                  

                                                                        20

15                

                    2015                                                  

Gen Braga MD                                        

          1 [facility]  

                                        [id number]    

                  

                                                                        20

15                

                    2015                                                  

Gen Braga MD                                        

          1 [facility]  

                                        [id number]    

                  

                                                                        20

15                

                    2015                                                  

Gen Braga MD                                        

          1 [facility]  

                                        [id number]    

                  

                                                                        20

15                

                    2015                                                  

Gen Braga MD                                        

          1 [facility]  

                                        [id number]    

                  

                                                                        20

15                

                    2015                                                  

Gen Braga MD                                        

          1 [facility]  

                                        [id number]    

                  

                                                                        20

15                

                    2015                                                  

Gen Braga MD                                        

          1 [facility]  

                                        [id number]    

                  

                                                                        20

15                

                    2015                                                  

Gen Braga MD                                        

          1 [facility]  

                                        [id number]    

                  

                                                                        20

15                

                    2015                                                  

Gen Braga MD                                        

          1 [facility]  

                                        [id number]    

                  

                                                                        20

15                

                    2015                                                  

Gen Braga MD                                        

          1 [facility]  

                                        [id number]    

                  

                                                                        20

16                

                    2016                                                  

Gen Braga MD                                        

          1 [facility]  

                                        [id number]    

                  

                                                                        20

16                

                    2016                                                  

Gen Braga MD                                        

          1 [facility]  

                                        [id number]    

                  

                                                                        20

16                

                    2016                                                  

Gen Braga MD                                        

          1 [facility]  

                                        [id number]    

                  

                                                                        20

16                

                    2016                                                  

Gen Braga MD                                        

          1 [facility]  

                                        [id number]    

                  

                                                                        20

16                

                    2016                                                  

Gen Braga MD                                        

          1 [facility]  

                                        [id number]    

                  

                                                                        20

16                

                    2016                                                  

Gen Braga MD                                        

          1 [facility]  

                                        [id number]    

                  

                                                                        20

16                

                    2016                                                  

Gen Braga MD                                        

          1 [facility]  

                                        [id number]    

                  

                                                                        20

16                

                    2016                                                  

Gen Braga MD                                        

          1 [facility]  

                                        [id number]    

                  

                                                                        20

16                

                    2016                                                  

Gen Braga MD                                        

          1 [facility]  

                                        [id number]    

                  

                                                                        20

16                

                    2016                                                  

Gen Braga MD                                        

          1 [facility]  

                                        [id number]    

                  

                                                                        20

16                

                    2016                                                  

Gen Braga MD                                        

          1 [facility]  

                                        [id number]    

                  

                                                                        20

16                

                    2016                                                  

Gen Braga MD                                        

          1 [facility]  

                                        [id number]    

                  

                                                                        20

16                

                    2016                                                  

Gen Braga MD                                        

          1 [facility]  

                                        [id number]    

                  

                                                                        20

16                

                    2016                                                  

Gen Braga MD                                        

          1 [facility]  

                                        [id number]    

                  

                                                                        20

16                

                    2016                                                  

Gen Braga MD                                        

          1 [facility]  

                                        [id number]    

                  

                                                                        20

16                

                    2016                                                  

Gen Braga MD                                        

          1 [facility]  

                                        [id number]    

                  

                                                                        20

16                

                    2016                                                  

Gen Braga MD                                        

          1 [facility]  

                                        [id number]    

                  

                                                                        20

16                

                    2016                                                  

Gen Braga MD                                        

          1 [facility]  

                                        [id number]    

                  

                                                                        20

16                

                    2016                                                  

Gen Braga MD                                        

          1 [facility]  

                                        [id number]    

                  

                                                                        20

16                

                    2016                                                  

Gen Braga MD                                        

          1 [facility]  

                                        [id number]    

                  

                                                                        20

16                

                    2016                                                  

Gen Braga MD                                        

          1 [facility]  

                                        [id number]    

                  

                                                                        20

16                

                    2016                                                  

Gen Braga MD                                        

          1 [facility]  

                                        [id number]    

                  

                                                                        20

16                

                    2016                                                  

Gen Braga MD                                        

          1 [facility]  

                                        [id number]    

                  

                                                                        20

16                

                    2016                                                  

Gen Braga MD                                        

          1 [facility]  

                                        [id number]    

                  

                                                                        20

16                

                    2016                                                  

Gen Braga MD                                        

          1 [facility]  

                                        [id number]    

                  

                                                                        20

16                

                    2016                                                  

Gen Braga MD                                        

          [facility]

REFILL NEEDING DATE                         [id number]

                                                                            

2016                                   

                                        Gen Braga MD                                        

          [facility]

REFILL NEEDING DATE                         [id number]

                                                                            

2016                                   

                                        Gen Braga MD                                        

          [facility]

REFILL NEEDING DATE                         [id number]

                                                                            

2016                                   

                                        Gen Braga MD                                        

          [facility]

REFILL NEEDING DATE                         [id number]

                                                                            

2016                                   

                                        MD GUERA McUMETONE

REFILL NEEDING DATE                         [id number]

                                                                            

2016                                   

                                        MD GUERA McUMETONE

REFILL NEEDING DATE                         [id number]

                                                                            

2016                                   

                                        MD GUERA McUMETONE

REFILL NEEDING DATE                         [id number]

                                                                            

2016                                   

                                        MD GUERA McUMETONE

REFILL NEEDING DATE                         [id number]

                                                                            

2016                                   

                                        MD GUERA McUMETONE

REFILL NEEDING DATE                         [id number]

                                                                            

2016                                   

                                        MD GUERA McUMETONE

REFILL NEEDING DATE                         [id number]

                                                                            

2016                                   

                                        Gen Braga MD                                        

          [facility]

REFILL NEEDING DATE                         [id number]

                                                                            

2016                                   

                                        MD GUERA McUMETONE

REFILL NEEDING DATE                         [id number]

                                                                            

2016                                   

                                        Gen Braga MD                                        

          [facility]

REFILL NEEDING DATE                         [id number]

                                                                            

2016                                   

                                        MD GUERA McUMETONE

REFILL NEEDING DATE                         [id number]

                                                                            

2016                                   

                                        MD GUERA McUMETONE

REFILL NEEDING DATE                         [id number]

                                                                            

2016                                   

                                        MD GUERA McUMETONE

REFILL NEEDING DATE                         [id number]

                                                                            

2016                                   

                                        MD GUERA McUMETONE

REFILL NEEDING DATE                         [id number]

                                                                            

2016                                   

                                        MD GUERA McUMETONE

REFILL NEEDING DATE                         [id number]

                                                                            

2016                                   

                                        MD GUERA McUMETONE

REFILL NEEDING DATE                         [id number]

                                                                            

2016                                   

                                        MD GUERA McUMETONE

REFILL NEEDING DATE                         [id number]

                                                                            

2016                                   

                                        MD JUAN McTONE

REFILL NEEDING DATE                         [id number]

                                                                            

2016                                   

                                        MD GUERA McUMETONE

REFILL NEEDING DATE                         [id number]

                                                                            

2016                                   

                                        MD GUERA McUMETONE

REFILL NEEDING DATE                         [id number]

                                                                            

2016                                   

                                        MD GUERA McUMETONE

REFILL NEEDING DATE                         [id number]

                                                                            

2016                                   

                                        MD GUERA McUMETONE

REFILL NEEDING DATE                         [id number]

                                                                            

2016                                   

                                        MD TOMAS McE

REFILL NEEDING DATE                         [id number]

                                                                            

2016                                   

                                        MD GUERA McUMETONE

REFILL NEEDING DATE                         [id number]

                                                                            

2016                                   

                                        Gen Braga MD                                        

          1 [facility]  

                                        [id number]    

                  

                                                                        20

16                

                    2016                                                  

Gen Braga MD                                        

          1 [facility]  

                                        [id number]    

                  

                                                                        20

16                

                    2016                                                  

Gen Braga MD                                        

          [facility]  

                                        [id number]    

                  

                                                                        20

16                

                    2016                                                  

Gen Braga MD                                        

          [facility]  

                                        [id number]    

                  

                                                                        20

16                

                    2016                                                  

Gen Braga MD                                        

          1 [facility]  

                                        [id number]    

                  

                                                                        20

16                

                    2016                                                  

Gen Braga MD                                        

          [facility]  

                                        [id number]    

                  

                                                                        20

16                

                    2016                                                  

Gen Braga MD                                        

          [facility]  

                                        [id number]    

                  

                                                                        20

16                

                    2016                                                  

Gen Braga MD                                        

          1 [facility]  

                                        [id number]    

                  

                                                                        20

16                

                    2016                                                  

Gen Braga MD                                        

          [facility]  

                                        [id number]    

                  

                                                                        20

16                

                    2016                                                  

Gen Braga MD                                        

          1 [facility]  

                                        [id number]    

                  

                                                                        20

16                

                    2016                                                  

Gen Braga MD                                        

          1 [facility]  

                                        [id number]    

                  

                                                                        20

16                

                    2016                                                  

Gen Braga MD                                        

          1 [facility]  

                                        [id number]    

                  

                                                                        20

16                

                    2016                                                  

Gen Braga MD                                        

          1 [facility]  

                                        [id number]    

                  

                                                                        20

16                

                    2016                                                  

Gen Braga MD                                        

          1 [facility]th [facility]  

                                        [id number]    

                  

                                                                        20

16                

                    2016                                                  

Gen Braga MD                                        

          1 mth [facility]  

                                        [id number]    

                  

                                                                        20

16                

                    2016                                                  

Gen Braga MD                                        

          1 mth fu  

                                        [id number]    

                  

                                                                        20

16                

                    2016                                                  

Gen Braga MD                                        

          1 [facility]  

                                        [id number]    

                  

                                                                        20

16                

                    2016                                                  

Gen Braga MD                                        

          1 [facility]  

                                        [id number]    

                  

                                                                        20

16                

                    2016                                                  

Gen Braga MD                                        

          1 [facility]  

                                        [id number]    

                  

                                                                        20

16                

                    2016                                                  

Gen Braga MD                                        

          1 [facility]  

                                        [id number]    

                  

                                                                        20

16                

                    2016                                                  

Gen Braga MD                                        

          1 [facility]  

                                        [id number]    

                  

                                                                        20

16                

                    2016                                                  

Gen Braga MD                                        

          1 [facility]  

                                        [id number]    

                  

                                                                        20

16                

                    2016                                                  

Gen Braga MD                                        

          [facility]  

                                        [id number]    

                  

                                                                        03/10/20

16                

                    03/10/2016                                                  

Gen Braga MD                                        

          [facility]  

                                        [id number]    

                  

                                                                        03/10/20

16                

                    03/10/2016                                                  

Gen Braga MD                                        

          [facility]  

                                        [id number]    

                  

                                                                        03/10/20

16                

                    03/10/2016                                                  

Gen Braga MD                                        

          [facility]  

                                        [id number]    

                  

                                                                        03/10/20

16                

                    03/10/2016                                                  

Gen Braga MD                                        

          [facility]  

                                        [id number]    

                  

                                                                        03/10/20

16                

                    03/10/2016                                                  

Gen Braga MD                                        

          [facility]  

                                        [id number]    

                  

                                                                        03/10/20

16                

                    03/10/2016                                                  

Gen Braga MD                                        

          [facility]  

                                        [id number]    

                  

                                                                        03/10/20

16                

                    03/10/2016                                                  

Gen Braga MD                                        

          [facility]  

                                        [id number]    

                  

                                                                        03/10/20

16                

                    03/10/2016                                                  

Gen Braga MD                                        

          [facility]  

                                        [id number]    

                  

                                                                        03/10/20

16                

                    03/10/2016                                                  

Gen Braga MD                                        

          [facility]  

                                        [id number]    

                  

                                                                        03/10/20

16                

                    03/10/2016                                                  

Gen Braga MD                                        

          [facility]  

                                        [id number]    

                  

                                                                        03/10/20

16                

                    03/10/2016                                                  

Gen Braga MD                                        

          [facility]  

                                        [id number]    

                  

                                                                        03/10/20

16                

                    03/10/2016                                                  

Gen Braga MD                                        

          [facility]  

                                        [id number]    

                  

                                                                        03/10/20

16                

                    03/10/2016                                                  

Gen Braga MD                                        

          [facility]  

                                        [id number]    

                  

                                                                        03/10/20

16                

                    03/10/2016                                                  

Gen Braga MD                                        

          [facility]  

                                        [id number]    

                  

                                                                        03/10/20

16                

                    03/10/2016                                                  

Gen Braga MD                                        

          [facility]  

                                        [id number]    

                  

                                                                        03/10/20

16                

                    03/10/2016                                                  

Gen Braga MD                                        

          [facility]  

                                        [id number]    

                  

                                                                        03/10/20

16                

                    03/10/2016                                                  

Gen Braga MD                                        

          [facility]  

                                        [id number]    

                  

                                                                        03/10/20

16                

                    03/10/2016                                                  

Gen Braga MD                                        

          [facility]  

                                        [id number]    

                  

                                                                        03/10/20

16                

                    03/10/2016                                                  

Gen Braga MD                                        

          [facility]  

                                        [id number]    

                  

                                                                        03/10/20

16                

                    03/10/2016                                                  

Gen Braga MD                                        

          [facility]  

                                        [id number]    

                  

                                                                        03/10/20

16                

                    03/10/2016                                                  

Gen Braga MD                                        

          [facility]  

                                        [id number]    

                  

                                                                        03/10/20

16                

                    03/10/2016                                                  

Gen Braga MD                                        

          [facility]  

                                        [id number]    

                  

                                                                        03/10/20

16                

                    03/10/2016                                                  

Gen Braga MD                                        

          [facility]  

                                        [id number]    

                  

                                                                        03/10/20

16                

                    03/10/2016                                                  

Gen Braga MD                                        

          [facility]  

                                        [id number]    

                  

                                                                        03/10/20

16                

                    03/10/2016                                                  

Gen Braga MD                                        

          1 [facility]  

                                        [id number]    

                  

                                                                        20

16                

                    2016                                                  

Gen Braga MD                                        

          1 [facility]  

                                        [id number]    

                  

                                                                        20

16                

                    2016                                                  

Gen Braga MD                                        

          1 [facility] fu  

                                        [id number]    

                  

                                                                        20

16                

                    2016                                                  

Gen Braga MD                                        

          1 [facility] fu  

                                        [id number]    

                  

                                                                        20

16                

                    2016                                                  

Gen Braga MD                                        

          1 [facility] fu  

                                        [id number]    

                  

                                                                        20

16                

                    2016                                                  

Gen Braga MD                                        

          1 [facility] fu  

                                        [id number]    

                  

                                                                        20

16                

                    2016                                                  

Gen Braga MD                                        

          1 [facility] fu  

                                        [id number]    

                  

                                                                        20

16                

                    2016                                                  

Gen Braga MD                                        

          1 [facility] fu  

                                        [id number]    

                  

                                                                        20

16                

                    2016                                                  

Gen Braga MD                                        

          1 [facility] fu  

                                        [id number]    

                  

                                                                        20

16                

                    2016                                                  

Gen Braga MD                                        

          1 [facility] fu  

                                        [id number]    

                  

                                                                        20

16                

                    2016                                                  

Gen Braga MD                                        

          1 [facility] fu  

                                        [id number]    

                  

                                                                        20

16                

                    2016                                                  

Gen Braga MD                                        

          1 [facility]  

                                        [id number]    

                  

                                                                        20

16                

                    2016                                                  

Gen Braga MD                                        

          1 [facility]  

                                        [id number]    

                  

                                                                        20

16                

                    2016                                                  

Gen Braga MD                                        

          1 [facility]  

                                        [id number]    

                  

                                                                        20

16                

                    2016                                                  

Gen Braga MD                                        

          1 [facility]  

                                        [id number]    

                  

                                                                        20

16                

                    2016                                                  

Gen Braga MD                                        

          1 [facility]  

                                        [id number]    

                  

                                                                        20

16                

                    2016                                                  

Gen Braga MD                                        

          1 [facility]  

                                        [id number]    

                  

                                                                        20

16                

                    2016                                                  

Gen Braga MD                                        

          1 [facility]  

                                        [id number]    

                  

                                                                        20

16                

                    2016                                                  

Gen Braga MD                                        

          1 [facility]  

                                        [id number]    

                  

                                                                        20

16                

                    2016                                                  

Gen Braga MD                                        

          1 [facility]  

                                        [id number]    

                  

                                                                        20

16                

                    2016                                                  

Gen Braga MD                                        

          1 [facility]  

                                        [id number]    

                  

                                                                        20

16                

                    2016                                                  

Gen Braga MD                                        

          Refill- 

Prednisone                              [id number]    

     

                                                                        20

16   

                          2016                                            

     

                                        Gen Braga MD                                        

          Refill- 

Prednisone                              [id number]    

     

                                                                        20

16   

                          2016                                            

     

                                        Gen Braga MD                                        

          Refill- 

Prednisone                              [id number]    

     

                                                                        20

16   

                          2016                                            

     

                                        Gen Braga MD                                        

          Refill- 

Prednisone                              [id number]    

     

                                                                        20

16   

                          2016                                            

     

                                        Gen Braga MD                                        

          Refill- 

Prednisone                              [id number]    

     

                                                                        20

16   

                          2016                                            

     

                                        Gen Braga MD                                        

          Refill- 

Prednisone                              [id number]    

     

                                                                        20

16   

                          2016                                            

     

                                        Gen Braga MD                                        

          Refill- 

Prednisone                              [id number]    

     

                                                                        20

16   

                          2016                                            

     

                                        Gen Braga MD                                        

          Refill- 

Prednisone                              [id number]    

     

                                                                        20

16   

                          2016                                            

     

                                        Gen Braga MD                                        

          Refill- 

Prednisone                              [id number]    

     

                                                                        20

16   

                          2016                                            

     

                                        Gen Braga MD                                        

          Refill- 

Prednisone                              [id number]    

     

                                                                        20

16   

                          2016                                            

     

                                        Gen Braga MD                                        

          Refill- 

Prednisone                              [id number]    

     

                                                                        20

16   

                          2016                                            

     

                                        Gen Braga MD                                        

          Refill- 

Prednisone                              [id number]    

     

                                                                        20

16   

                          2016                                            

     

                                        Gen Braga MD                                        

          Refill- 

Prednisone                              [id number]    

     

                                                                        20

16   

                          2016                                            

     

                                        Gen Braga MD                                        

          Refill- 

Prednisone                              [id number]    

     

                                                                        20

16   

                          2016                                            

     

                                        Gen Braga MD                                        

          Refill- 

Prednisone                              [id number]    

     

                                                                        20

16   

                          2016                                            

     

                                        Gen Braga MD                                        

          Refill- 

Prednisone                              [id number]    

     

                                                                        20

16   

                          2016                                            

     

                                        Gen Braga MD                                        

          Refill- 

Prednisone                              [id number]    

     

                                                                        20

16   

                          2016                                            

     

                                        Gen Braga MD                                        

          Refill- 

Prednisone                              [id number]    

     

                                                                        20

16   

                          2016                                            

     

                                        Gen Braga MD                                        

          Refill- 

Prednisone                              [id number]    

     

                                                                        20

16   

                          2016                                            

     

                                        Gen Braga MD                                        

          Refill- 

Prednisone                              [id number]    

     

                                                                        20

16   

                          2016                                            

     

                                        Gen Braga MD                                        

          Refill- 

Prednisone                              [id number]    

     

                                                                        20

16   

                          2016                                            

     

                                        Gen Braga MD                                        

          Refill- 

Prednisone                              [id number]    

     

                                                                        20

16   

                          2016                                            

     

                                        Gen Braga MD                                        

          Refill- 

Prednisone                              [id number]    

     

                                                                        20

16   

                          2016                                            

     

                                        Gen Braga MD                                        

          Refill- 

Prednisone                              [id number]    

     

                                                                        20

16   

                          2016                                            

     

                                        Gen Braga MD                                        

          Unknown   

                                        [id number]    

                   

                                                                        20

16                 

                    2016                                                  

Gen Braga MD                                        

          Unknown   

                                        [id number]    

                   

                                                                        20

16                 

                    2016                                                  

Gen Braga MD                                        

          Unknown   

                                        [id number]    

                   

                                                                        20

16                 

                    2016                                                  

Gen Braga MD                                        

          Unknown   

                                        [id number]    

                   

                                                                        20

16                 

                    2016                                                  

Gen Braga MD                                        

          Unknown   

                                        [id number]    

                   

                                                                        20

16                 

                    2016                                                  

Gen Braga MD                                        

          Unknown   

                                        [id number]    

                   

                                                                        20

16                 

                    2016                                                  

Gen Braga MD                                        

          Unknown   

                                        [id number]    

                   

                                                                        20

16                 

                    2016                                                  

Gen Braga MD                                        

          Unknown   

                                        [id number]    

                   

                                                                        20

16                 

                    2016                                                  

Gen Braga MD                                        

          Unknown   

                                        [id number]    

                   

                                                                        20

16                 

                    2016                                                  

Gen Braga MD                                        

          Unknown   

                                        [id number]    

                   

                                                                        20

16                 

                    2016                                                  

Gen Braga MD                                        

          Unknown   

                                        [id number]    

                   

                                                                        20

16                 

                    2016                                                  

Gen Braga MD                                        

          Unknown   

                                        [id number]    

                   

                                                                        20

16                 

                    2016                                                  

Gen Braga MD                                        

          Unknown   

                                        [id number]    

                   

                                                                        20

16                 

                    2016                                                  

Gen Braga MD                                        

          Unknown   

                                        [id number]    

                   

                                                                        20

16                 

                    2016                                                  

Gen Braga MD                                        

          Unknown   

                                        [id number]    

                   

                                                                        20

16                 

                    2016                                                  

Gen Braga MD                                        

          Unknown   

                                        [id number]    

                   

                                                                        20

16                 

                    2016                                                  

Gen Braga MD                                        

          Unknown   

                                        [id number]    

                   

                                                                        20

16                 

                    2016                                                  

Gen Braga MD                                        

          Unknown   

                                        [id number]    

                   

                                                                        20

16                 

                    2016                                                  

Gen Braga MD                                        

          Unknown   

                                        [id number]    

                   

                                                                        20

16                 

                    2016                                                  

Gen Braga MD                                        

          Unknown   

                                        [id number]    

                   

                                                                        20

16                 

                    2016                                                  

Gen Braga MD                                        

          Unknown   

                                        [id number]    

                   

                                                                        20

16                 

                    2016                                                  

Gen Braga MD                                        

          Unknown   

                                        [id number]    

                   

                                                                        20

16                 

                    2016                                                  

Gen Braga MD                                        

          Unknown   

                                        [id number]    

                   

                                                                        20

16                 

                    2016                                                  

Gen Braga MD                                        

          1 [facility]  

                                        [id number]    

                  

                                                                        05/10/20

16                

                    05/10/2016                                                  

Gen Braga MD                                        

          1 [facility]  

                                        [id number]    

                  

                                                                        05/10/20

16                

                    05/10/2016                                                  

Gen Braga MD                                        

          1 [facility]  

                                        [id number]    

                  

                                                                        05/10/20

16                

                    05/10/2016                                                  

Gen Braga MD                                        

          1 [facility]  

                                        [id number]    

                  

                                                                        05/10/20

16                

                    05/10/2016                                                  

Gen Braga MD                                        

          1 [facility]  

                                        [id number]    

                  

                                                                        05/10/20

16                

                    05/10/2016                                                  

Gen Braga MD                                        

          1 [facility]  

                                        [id number]    

                  

                                                                        05/10/20

16                

                    05/10/2016                                                  

Gen Braga MD                                        

          1 [facility]  

                                        [id number]    

                  

                                                                        05/10/20

16                

                    05/10/2016                                                  

Gen Braga MD                                        

          1 [facility]  

                                        [id number]    

                  

                                                                        05/10/20

16                

                    05/10/2016                                                  

Gen Braga MD                                        

          1 [facility]  

                                        [id number]    

                  

                                                                        05/10/20

16                

                    05/10/2016                                                  

Gen Braga MD                                        

          1 [facility]  

                                        [id number]    

                  

                                                                        05/10/20

16                

                    05/10/2016                                                  

Gen Braga MD                                        

          1 [facility]  

                                        [id number]    

                  

                                                                        05/10/20

16                

                    05/10/2016                                                  

Gen Braga MD                                        

          1 [facility]  

                                        [id number]    

                  

                                                                        05/10/20

16                

                    05/10/2016                                                  

Gen Braga MD                                        

          1 [facility]  

                                        [id number]    

                  

                                                                        05/10/20

16                

                    05/10/2016                                                  

Gen Braga MD                                        

          1 [facility]  

                                        [id number]    

                  

                                                                        05/10/20

16                

                    05/10/2016                                                  

Gen Braga MD                                        

          1 [facility]  

                                        [id number]    

                  

                                                                        05/10/20

16                

                    05/10/2016                                                  

Gen Braga MD                                        

          1 [facility]  

                                        [id number]    

                  

                                                                        05/10/20

16                

                    05/10/2016                                                  

Gen Braga MD                                        

          1 [facility]  

                                        [id number]    

                  

                                                                        05/10/20

16                

                    05/10/2016                                                  

Gen Braga MD                                        

          1 [facility]  

                                        [id number]    

                  

                                                                        05/10/20

16                

                    05/10/2016                                                  

Gen Braga MD                                        

          1 [facility]  

                                        [id number]    

                  

                                                                        05/10/20

16                

                    05/10/2016                                                  

Gen Braga MD                                        

          1 [facility]  

                                        [id number]    

                  

                                                                        05/10/20

16                

                    05/10/2016                                                  

Gen Braga MD                                        

          1 [facility] fu/ 

right foot                              [id number]    

     

                                                                        20

16   

                          2016                                            

     

                                        Gen Braga MD                                        

          1 [facility] fu/ 

right foot                              [id number]    

     

                                                                        20

16   

                          2016                                            

     

                                        Gen Braga MD                                        

          1 [facility] fu/ 

right foot                              [id number]    

     

                                                                        20

16   

                          2016                                            

     

                                        Gen Braga MD                                        

          1 mth fu/ 

right foot                              [id number]    

     

                                                                        20

16   

                          2016                                            

     

                                        Gen Braga MD                                        

          1 mth fu/ 

right foot                              [id number]    

     

                                                                        20

16   

                          2016                                            

     

                                        Gen Braga MD                                        

          1 mth fu/ 

right foot                              [id number]    

     

                                                                        20

16   

                          2016                                            

     

                                        Gen Braga MD                                        

          1 mth fu/ 

right foot                              [id number]    

     

                                                                        20

16   

                          2016                                            

     

                                        Gen Braga MD                                        

          1 mth fu/ 

right foot                              [id number]    

     

                                                                        20

16   

                          2016                                            

     

                                        Gen Braga MD                                        

          1 mth fu/ 

right foot                              [id number]    

     

                                                                        20

16   

                          2016                                            

     

                                        Gen Braga MD                                        

          1 mth fu/ 

right foot                              [id number]    

     

                                                                        20

16   

                          2016                                            

     

                                        Gen Braga MD                                        

          1 mth fu/ 

right foot                              [id number]    

     

                                                                        20

16   

                          2016                                            

     

                                        Gen Braga MD                                        

          1 mth fu/ 

right foot                              [id number]    

     

                                                                        20

16   

                          2016                                            

     

                                        Gen Braga MD                                        

          1 mth fu/ 

right foot                              [id number]    

     

                                                                        20

16   

                          2016                                            

     

                                        Gen Braga MD                                        

          1 mth fu/ 

right foot                              [id number]    

     

                                                                        20

16   

                          2016                                            

     

                                        Gen Braga MD                                        

          1 mth fu/ 

right foot                              [id number]    

     

                                                                        20

16   

                          2016                                            

     

                                        Gen Braga MD                                        

          1 mth fu/ 

right foot                              [id number]    

     

                                                                        20

16   

                          2016                                            

     

                                        Gen Braga MD                                        

          1 mth fu/ 

right foot                              [id number]    

     

                                                                        20

16   

                          2016                                            

     

                                        Gen Braga MD                                        

          1 mth fu/ 

right foot                              [id number]    

     

                                                                        20

16   

                          2016                                            

     

                                        Gen Braga MD                                        

          1 mth fu/ 

right foot                              [id number]    

     

                                                                        20

16   

                          2016                                            

     

                                        Gen Braga MD                                        

          1 [facility] fu  

                                        [id number]    

                  

                                                                        20

16                

                    2016                                                  

Gen Braga MD                                        

          1 [facility] fu  

                                        [id number]    

                  

                                                                        20

16                

                    2016                                                  

Gen Braga MD                                        

          1 [facility] fu  

                                        [id number]    

                  

                                                                        20

16                

                    2016                                                  

Gen Braga MD                                        

          1 [facility] fu  

                                        [id number]    

                  

                                                                        20

16                

                    2016                                                  

Gen Braga MD                                        

          1 [facility] fu  

                                        [id number]    

                  

                                                                        20

16                

                    2016                                                  

Gen Braga MD                                        

          1 [facility] fu  

                                        [id number]    

                  

                                                                        20

16                

                    2016                                                  

Gen Braga MD                                        

          1 [facility] fu  

                                        [id number]    

                  

                                                                        20

16                

                    2016                                                  

Gen Braga MD                                        

          1 mth fu  

                                        [id number]    

                  

                                                                        20

16                

                    2016                                                  

Gen Braga MD                                        

          1 [facility] fu  

                                        [id number]    

                  

                                                                        20

16                

                    2016                                                  

Gen Braga MD                                        

          1 [facility] fu  

                                        [id number]    

                  

                                                                        20

16                

                    2016                                                  

Gen Braga MD                                        

          1 [facility] fu  

                                        [id number]    

                  

                                                                        20

16                

                    2016                                                  

Gen Braga MD                                        

          1 mth fu  

                                        [id number]    

                  

                                                                        20

16                

                    2016                                                  

Gen Braga MD                                        

          1 [facility] fu  

                                        [id number]    

                  

                                                                        20

16                

                    2016                                                  

Gen Braga MD                                        

          1 mth fu  

                                        [id number]    

                  

                                                                        20

16                

                    2016                                                  

Gen Braga MD                                        

          1 [facility]  

                                        [id number]    

                  

                                                                        20

16                

                    2016                                                  

Gen Braga MD                                        

          1 [facility] fu  

                                        [id number]    

                  

                                                                        20

16                

                    2016                                                  

Gen Braga MD                                        

          1 [facility] fu  

                                        [id number]    

                  

                                                                        20

16                

                    2016                                                  

Gen Braga MD                                        

          Unknown   

                                        [id number]    

                   

                                                                        20

16                 

                    2016                                                  

Gen Braga MD                                        

          Unknown   

                                        [id number]    

                   

                                                                        20

16                 

                    2016                                                  

Gen Braga MD                                        

          Unknown   

                                        [id number]    

                   

                                                                        20

16                 

                    2016                                                  

Gen Braga MD                                        

          Unknown   

                                        [id number]    

                   

                                                                        20

16                 

                    2016                                                  

Gen Braga MD                                        

          Unknown   

                                        [id number]    

                   

                                                                        20

16                 

                    2016                                                  

Gen Braga MD                                        

          Unknown   

                                        [id number]    

                   

                                                                        20

16                 

                    2016                                                  

Gen Braga MD                                        

          Unknown   

                                        [id number]    

                   

                                                                        20

16                 

                    2016                                                  

Gen Braga MD                                        

          Unknown   

                                        [id number]    

                   

                                                                        20

16                 

                    2016                                                  

Gen Braga MD                                        

          Unknown   

                                        [id number]    

                   

                                                                        20

16                 

                    2016                                                  

Gen Braga MD                                        

          Unknown   

                                        [id number]    

                   

                                                                        20

16                 

                    2016                                                  

Gen Braga MD                                        

          Unknown   

                                        [id number]    

                   

                                                                        20

16                 

                    2016                                                  

Gen Braga MD                                        

          Unknown   

                                        [id number]    

                   

                                                                        20

16                 

                    2016                                                  

Gen Braga MD                                        

          Unknown   

                                        [id number]    

                   

                                                                        20

16                 

                    2016                                                  

Gen Braga MD                                        

          Unknown   

                                        [id number]    

                   

                                                                        20

16                 

                    2016                                                  

Gen Braga MD                                        

          Unknown   

                                        [id number]    

                   

                                                                        20

16                 

                    2016                                                  

Gen Braga MD                                        

          Unknown   

                                        [id number]    

                   

                                                                        20

16                 

                    2016                                                  

Gen Braga MD                                        

          Unknown   

                                        [id number]    

                   

                                                                        20

16                 

                    2016                                                  

Gen Braga MD                                        

          Unknown   

                                        [id number]    

                   

                                                                        20

16                 

                    2016                                                  

Gen Braga MD                                        

          1 [facility]  

                                        [id number]    

                  

                                                                        20

16                

                    2016                                                  

Gen Braga MD                                        

          1 [facility]  

                                        [id number]    

                  

                                                                        20

16                

                    2016                                                  

Gen Braga MD                                        

          1 [facility]  

                                        [id number]    

                  

                                                                        20

16                

                    2016                                                  

Gen Braga MD                                        

          1 [facility]  

                                        [id number]    

                  

                                                                        20

16                

                    2016                                                  

Gen Braga MD                                        

          1 [facility]  

                                        [id number]    

                  

                                                                        20

16                

                    2016                                                  

Gen Braga MD                                        

          1 [facility]  

                                        [id number]    

                  

                                                                        20

16                

                    2016                                                  

Gen Braga MD                                        

          1 [facility]  

                                        [id number]    

                  

                                                                        20

16                

                    2016                                                  

Gen Braga MD                                        

          1 [facility]  

                                        [id number]    

                  

                                                                        20

16                

                    2016                                                  

Gen Braga MD                                        

          1 [facility]  

                                        [id number]    

                  

                                                                        20

16                

                    2016                                                  

Gen Braga MD                                        

          1 [facility]  

                                        [id number]    

                  

                                                                        20

16                

                    2016                                                  

Gen Braga MD                                        

          1 [facility] fu  

                                        [id number]    

                  

                                                                        20

16                

                    2016                                                  

Gen Braga MD                                        

          1 [facility] fu  

                                        [id number]    

                  

                                                                        20

16                

                    2016                                                  

Gen Braga MD                                        

          1 [facility]  

                                        [id number]    

                  

                                                                        20

16                

                    2016                                                  

Gen Braga MD                                        

          1 [facility] fu  

                                        [id number]    

                  

                                                                        20

16                

                    2016                                                  

Gen Braga MD                                        

          Unknown   

                                        [id number]    

                   

                                                                        20

16                 

                    2016                                                  

Gen Braga MD                                        

          Unknown   

                                        [id number]    

                   

                                                                        20

16                 

                    2016                                                  

Gen Braga MD                                        

          Unknown   

                                        [id number]    

                   

                                                                        20

16                 

                    2016                                                  

Gen Braga MD                                        

          Unknown   

                                        [id number]    

                   

                                                                        20

16                 

                    2016                                                  

Gen Braga MD                                        

          Unknown   

                                        [id number]    

                   

                                                                        20

16                 

                    2016                                                  

Gen Braga MD                                        

          Unknown   

                                        [id number]    

                   

                                                                        20

16                 

                    2016                                                  

Gen Braga MD                                        

          Unknown   

                                        [id number]    

                   

                                                                        20

16                 

                    2016                                                  

Gen Braga MD                                        

          Unknown   

                                        [id number]    

                   

                                                                        20

16                 

                    2016                                                  

Gen Braga MD                                        

          Unknown   

                                        [id number]    

                   

                                                                        20

16                 

                    2016                                                  

Gen Braga MD                                        

          Unknown   

                                        [id number]    

                   

                                                                        20

16                 

                    2016                                                  

Gen Braga MD                                        

          Unknown   

                                        [id number]    

                   

                                                                        20

16                 

                    2016                                                  

Gen Braga MD                                        

          Unknown   

                                        [id number]    

                   

                                                                        20

16                 

                    2016                                                  

Gen Braga MD                                        

          Unknown   

                                        [id number]    

                   

                                                                        20

16                 

                    2016                                                  

Gen Braga MD                                        

          Unknown   

                                        [id number]    

                   

                                                                        20

16                 

                    2016                                                  

Gen Braga MD                                        

          Unknown   

                                        [id number]    

                   

                                                                        20

16                 

                    2016                                                  

Gen Braga MD                                        

          Unknown   

                                        [id number]    

                   

                                                                        20

16                 

                    2016                                                  

Gen Braga MD                                        

          TSPOT     

                          [id number]                  

       

                                                     2016                                                  

Gen Braga MD                                        

          TSPOT     

                          [id number]                  

       

                                                     2016                                                  

Gen Braga MD                                        

          TSPOT     

                          [id number]                  

       

                                                     2016                                                  

Gen Braga MD                                        

          TSPOT     

                          [id number]                  

       

                                                     2016                                                  

Gen Braga MD                                        

          TSPOT     

                          [id number]                  

       

                                                     2016                                                  

Gen Braga MD                                        

          TSPOT     

                          [id number]                  

       

                                                     2016                                                  

MD DOMENICA McOT     

                          [id number]                  

       

                                                     2016                                                  

MD DOMENICA McOT     

                          [id number]                  

       

                                                     2016                                                  

Gen Braga MD                                        

          TSPOT     

                          [id number]                  

       

                                                     2016                                                  

MD DOMENICA McOT     

                          [id number]                  

       

                                                     2016                                                  

Gen Braga MD                                        

          TSPOT     

                          [id number]                  

       

                                                     2016                                                  

Gen Braga MD                                        

          TSPOT     

                          [id number]                  

       

                                                     2016                                                  

Gen Braga MD                                        

          TSPOT     

                          [id number]                  

       

                                                     2016                                                  

MD DOMENICA McOT     

                          [id number]                  

       

                                                     2016                                                  

Gen Braga MD                                        

          TSPOT     

                          [id number]                  

       

                                                     2016                                                  

Gen Braga MD                                        

          1 [facility]  

                                        [id number]    

                  

                                                                        20

16                

                    2016                                                  

Gen Braga MD                                        

          1 [facility]  

                                        [id number]    

                  

                                                                        20

16                

                    2016                                                  

Gen Braga MD                                        

          1 [facility]  

                                        [id number]    

                  

                                                                        20

16                

                    2016                                                  

Gen Braga MD                                        

          1 [facility]  

                                        [id number]    

                  

                                                                        20

16                

                    2016                                                  

Gen Braga MD                                        

          1 [facility]  

                                        [id number]    

                  

                                                                        20

16                

                    2016                                                  

Gen Braga MD                                        

          1 [facility]  

                                        [id number]    

                  

                                                                        20

16                

                    2016                                                  

Gen Braga MD                                        

          1 [facility]  

                                        [id number]    

                  

                                                                        20

16                

                    2016                                                  

Gen Braga MD                                        

          1 [facility]  

                                        [id number]    

                  

                                                                        20

16                

                    2016                                                  

Gen Braga MD                                        

          1 [facility]  

                                        [id number]    

                  

                                                                        20

16                

                    2016                                                  

Gen Braga MD                                        

          1 [facility]  

                                        [id number]    

                  

                                                                        20

16                

                    2016                                                  

Gen Braga MD                                        

          1 [facility]  

                                        [id number]    

                  

                                                                        20

16                

                    2016                                                  

Gen Braga MD                                        

          1 [facility]  

                                        [id number]    

                  

                                                                        20

16                

                    2016                                                  

Gen Braga MD                                        

          1 [facility]  

                                        [id number]    

                  

                                                                        20

16                

                    2016                                                  

Gen Braga MD                                        

          Refill- 

Prednisone                              [id number]    

     

                                                                        10/17/20

16   

                          10/17/2016                                            

     

                                        Gen Braga MD                                        

          Refill- 

Prednisone                              [id number]    

     

                                                                        10/17/20

16   

                          10/17/2016                                            

     

                                        Gen Braga MD                                        

          Refill- 

Prednisone                              [id number]    

     

                                                                        10/17/20

16   

                          10/17/2016                                            

     

                                        Gen Braga MD                                        

          Refill- 

Prednisone                              [id number]    

     

                                                                        10/17/20

16   

                          10/17/2016                                            

     

                                        Gen Braga MD                                        

          Refill- 

Prednisone                              [id number]    

     

                                                                        10/17/20

16   

                          10/17/2016                                            

     

                                        Gen Braga MD                                        

          Refill- 

Prednisone                              [id number]    

     

                                                                        10/17/20

16   

                          10/17/2016                                            

     

                                        Gen Braga MD                                        

          Refill- 

Prednisone                              [id number]    

     

                                                                        10/17/20

16   

                          10/17/2016                                            

     

                                        Gen Braga MD                                        

          Refill- 

Prednisone                              [id number]    

     

                                                                        10/17/20

16   

                          10/17/2016                                            

     

                                        Gen Braga MD                                        

          Refill- 

Prednisone                              [id number]    

     

                                                                        10/17/20

16   

                          10/17/2016                                            

     

                                        Gen Braga MD                                        

          Refill- 

Prednisone                              [id number]    

     

                                                                        10/17/20

16   

                          10/17/2016                                            

     

                                        Gen Braga MD                                        

          Refill- 

Prednisone                              [id number]    

     

                                                                        10/17/20

16   

                          10/17/2016                                            

     

                                        Gen Braga MD                                        

          Refill- 

Prednisone                              [id number]    

     

                                                                        10/17/20

16   

                          10/17/2016                                            

     

                                        Gen Braga MD                                        

          1 [facility]  

                                        [id number]    

                  

                                                                        10/24/20

16                

                    10/24/2016                                                  

Gen Braga MD                                        

          1 [facility]  

                                        [id number]    

                  

                                                                        10/24/20

16                

                    10/24/2016                                                  

Gen Braga MD                                        

          1 [facility]  

                                        [id number]    

                  

                                                                        10/24/20

16                

                    10/24/2016                                                  

Gen Braga MD                                        

          1 [facility]  

                                        [id number]    

                  

                                                                        10/24/20

16                

                    10/24/2016                                                  

Gen Braga MD                                        

          1 [facility]  

                                        [id number]    

                  

                                                                        10/24/20

16                

                    10/24/2016                                                  

Gen Braga MD                                        

          1 [facility]  

                                        [id number]    

                  

                                                                        10/24/20

16                

                    10/24/2016                                                  

Gen Braga MD                                        

          1 [facility]  

                                        [id number]    

                  

                                                                        10/24/20

16                

                    10/24/2016                                                  

Gen Braga MD                                        

          1 [facility]  

                                        [id number]    

                  

                                                                        10/24/20

16                

                    10/24/2016                                                  

Gen Braga MD                                        

          1 [facility]  

                                        [id number]    

                  

                                                                        10/24/20

16                

                    10/24/2016                                                  

Gen Braga MD                                        

          1 [facility]  

                                        [id number]    

                  

                                                                        10/24/20

16                

                    10/24/2016                                                  

Gen Braga MD                                        

          1 [facility]  

                                        [id number]    

                  

                                                                        10/24/20

16                

                    10/24/2016                                                  

Gen Braga MD                                        

          [facility]  

                                        [id number]    

                  

                                                                        10/25/20

16                

                    10/25/2016                                                  

Gen Braga MD                                        

          [facility]  

                                        [id number]    

                  

                                                                        10/25/20

16                

                    10/25/2016                                                  

Gen Braga MD                                        

          [facility]  

                                        [id number]    

                  

                                                                        10/25/20

16                

                    10/25/2016                                                  

Gen Braga MD                                        

          [facility]  

                                        [id number]    

                  

                                                                        10/25/20

16                

                    10/25/2016                                                  

Gen Braga MD                                        

          [facility]  

                                        [id number]    

                  

                                                                        10/25/20

16                

                    10/25/2016                                                  

Gen Braga MD                                        

          [facility]  

                                        [id number]    

                  

                                                                        10/25/20

16                

                    10/25/2016                                                  

Gen Braga MD                                        

          [facility]  

                                        [id number]    

                  

                                                                        10/25/20

16                

                    10/25/2016                                                  

Gen Braga MD                                        

          [facility]  

                                        [id number]    

                  

                                                                        10/25/20

16                

                    10/25/2016                                                  

Gen Braga MD                                        

          [facility]  

                                        [id number]    

                  

                                                                        10/25/20

16                

                    10/25/2016                                                  

Gen Braga MD                                        

          [facility]  

                                        [id number]    

                  

                                                                        10/25/20

16                

                    10/25/2016                                                  

Gen Braga MD                                        

          1 [facility]  

                                        [id number]    

                  

                                                                        20

16                

                    2016                                                  

Gen Braga MD                                        

          1 [facility]  

                                        [id number]    

                  

                                                                        20

16                

                    2016                                                  

Gen Braga MD                                        

          1 [facility]  

                                        [id number]    

                  

                                                                        20

16                

                    2016                                                  

Gen Braga MD                                        

          1 [facility]  

                                        [id number]    

                  

                                                                        20

16                

                    2016                                                  

Gen Braga MD                                        

          1 [facility]  

                                        [id number]    

                  

                                                                        20

16                

                    2016                                                  

Gen Braga MD                                        

          1 [facility]  

                                        [id number]    

                  

                                                                        20

16                

                    2016                                                  

Gen Braga MD                                        

          1 [facility]  

                                        [id number]    

                  

                                                                        20

16                

                    2016                                                  

Gen Braga MD                                        

          1 [facility]  

                                        [id number]    

                  

                                                                        20

16                

                    2016                                                  

Gen Braga MD                                        

          1 [facility]  

                                        [id number]    

                  

                                                                        20

16                

                    2016                                                  

Gen Braga MD                                        

          1 [facility]  

                                        [id number]    

                  

                                                                        12/15/20

16                

                    12/15/2016                                                  

Gen Braga MD                                        

          1 [facility]  

                                        [id number]    

                  

                                                                        12/15/20

16                

                    12/15/2016                                                  

Gen Braga MD                                        

          1 [facility]  

                                        [id number]    

                  

                                                                        12/15/20

16                

                    12/15/2016                                                  

Gen Braga MD                                        

          1 [facility]  

                                        [id number]    

                  

                                                                        12/15/20

16                

                    12/15/2016                                                  

Gen Braga MD                                        

          1 [facility]  

                                        [id number]    

                  

                                                                        12/15/20

16                

                    12/15/2016                                                  

Gen Braga MD                                        

          1 [facility]  

                                        [id number]    

                  

                                                                        12/15/20

16                

                    12/15/2016                                                  

Gen Braga MD                                        

          1 [facility]  

                                        [id number]    

                  

                                                                        12/15/20

16                

                    12/15/2016                                                  

Gen Braga MD                                        

          1 [facility]  

                                        [id number]    

                  

                                                                        12/15/20

16                

                    12/15/2016                                                  

Gen Braga MD                                        

          Unknown   

                                        [id number]    

                   

                                                                        20

17                 

                    2017                                                  

Gen Braga MD                                        

          Unknown   

                                        [id number]    

                   

                                                                        20

17                 

                    2017                                                  

Gen Braga MD                                        

          Unknown   

                                        [id number]    

                   

                                                                        20

17                 

                    2017                                                  

Gen Braga MD                                        

          Unknown   

                                        [id number]    

                   

                                                                        20

17                 

                    2017                                                  

Gen Braga MD                                        

          Unknown   

                                        [id number]    

                   

                                                                        20

17                 

                    2017                                                  

Gen Braga MD                                        

          Unknown   

                                        [id number]    

                   

                                                                        20

17                 

                    2017                                                  

Gen Braga MD                                        

          Unknown   

                                        [id number]    

                   

                                                                        20

17                 

                    2017                                                  

Gen Braga MD                                        

          1 [facility]  

                                        [id number]    

                  

                                                                        20

17                

                    2017                                                  

Gen Braga MD                                        

          1 [facility]  

                                        [id number]    

                  

                                                                        20

17                

                    2017                                                  

Gen Braga MD                                        

          1 [facility]  

                                        [id number]    

                  

                                                                        20

17                

                    2017                                                  

Gen Braga MD                                        

          Unknown   

                                        [id number]    

                   

                                                                        20

17                 

                    2017                                                  

Gen Braga MD                                        

          Unknown   

                                        [id number]    

                   

                                                                        20

17                 

                    2017                                                  

Gen Braga MD                                        

          Unknown   

                                        [id number]    

                   

                                                                        20

17                 

                    2017                                                  

Gen Braga MD                                        

          Unknown   

                                        [id number]    

                   

                                                                        20

17                 

                    2017                                                  

Gen Braga MD                                        

          Unknown   

                                        [id number]    

                   

                                                                        20

17                 

                    2017                                                  

Gen Braga MD                                        

          Unknown   

                                        [id number]    

                   

                                                                        20

17                 

                    2017                                                  

Gen Braga MD                                        

          Unknown   

                                        [id number]    

                   

                                                                        20

17                 

                    2017                                                  

Gen Braga MD                                        

          Unknown   

                                        [id number]    

                   

                                                                        20

17                 

                    2017                                                  

Gen Braga MD                                        

          Unknown   

                                        [id number]    

                   

                                                                        20

17                 

                    2017                                                  

Gen Braga MD                                        

          Unknown   

                                        [id number]    

                   

                                                                        20

17                 

                    2017                                                  

Gen Braga MD                                        

          Unknown   

                                        [id number]    

                   

                                                                        20

17                 

                    2017                                                  

Gen Braga MD                                        

          Unknown   

                                        [id number]    

                   

                                                                        20

17                 

                    2017                                                  

Gen Braga MD                                        

          Unknown   

                                        [id number]    

                   

                                                                        20

17                 

                    2017                                                  

Gen Braga MD                                        

          Unknown   

                                        [id number]    

                   

                                                                        20

17                 

                    2017                                                  

Gen Braga MD                                        

          Unknown   

                                        [id number]    

                   

                                                                        20

17                 

                    2017                                                  

Gen Braga MD                                        

          Unknown   

                                        [id number]    

                   

                                                                        20

17                 

                    2017                                                  

Gen Braga MD                                        

          1 [facility]  

                                        [id number]    

                  

                                                                        20

17                

                    2017                                                  

Gen Braga MD                                        

          1 [facility]  

                                        [id number]    

                  

                                                                        20

17                

                    2017                                                  

Gen Braga MD                                        

          ER 

Visit/Hand inflammation                         

[id number]                                            

                                               02/15/2017                       

  02/15/2017   

                                                    Gen Braga              

      



                                                                                
                                                                                
                                                                                
                                                                                
                                                                                
                                                                                
                                                                                
                                                                                
                                                                                
                                                                                
                                                                                
                                                                                
                                                                                
                                                                                
                                                                                
                                                                                
                                                                                
                                                                                
                                                                                
                                                                                
                                                                                
                                                                                
                                                                                
                                                                                
                                                                                
                                                                                
                                                                                
                                                                                
                                                                                
                                                                                
                                                                                
                                                                                
                                                                                
                                                                                
                                                                                
                                                                                
                                                                                
                                                                                
                                                                                
                                                                                
                                                                                
                                                                                
                                                                                
                                                                                
                                                                                
                                                                                
                                                                                
                                                                                
                                                                                
                                                                                
                                                                                
                                                                                
                                                                                
                                                                                
                                                                                
                                                                                
                                                                                
                                                                                
                                                                                
                                                                                
                                                                                
                                                                                
                                                                                
                                                                                
                                                                                
                                                                                
                                                                                
                                                                                
                                                                                
                                                                                
                                                                                
                                                                                
                                                                                
                                                                                
                                                                                
                                                                                
                                                                                
                                                                                
                                                                                
                                                                                
                                                                                
                                                                                
                                                                                
                                                                                
                                                                                
                                                                                
                                                                                
                                                                                
                                                                                
                                                                                
                                                                                
                                                                                
                                                                                
                                                                                
                                                                                
                                                                                
                                                                                
                                                                                
                                                                                
                                                                                
                                                                                
                                                                                
                                                                                
                                                                                
                                                                                
                                                                                
                                                                                
                                                                                
                                                                                
                                                                                
                                                                                
                                                                                
                                                                                
                                                                                
                                                                                
                                                                                
                                                                                
                                                                                
                                                                                
                                                                                
                                                                                
                                                                                
                                                                                
                                                                                
                                                                                
                                                                                
                                                                                
                                                                                
                                                                                
                                                                                
                                                                                
                                                                                
                                                                                
                                                                                
                                                                                
                                                                                
                                                                                
                                                                                
                                                                                
                                                                                
                                                                                
                                                                                
                                                                                
                                                                                
                                                                                
                                                                                
                                                                                
                                                                                
                                                                                
                                                                                
                                                                                
                                                                                
                                                                                
                                                                                
                                                                                
                                                                                
                                                                                
                                                                                
                                                                                
                                                                                
                                                                                
                                                                                
                                                                                
                                                                                
                                                                                
                                                                                
                                                                                
                                                                                
                                                                                
                                                                                
                                                                                
                                                                                
                                                                                
                                                                                
                                                                                
                                                                                
                                                                                
                                                                                
                                                                                
                                                                                
                                                                                
                                                                                
                                                                                
                                                                                
                                                                                
                                                               



Procedures

        



                                        No Data Provided for This Section



                                                    



Assessment and Plan

                    



                                        No Data Provided for This Section       

             



                                     



Plan of Care





                                        No Data Provided for This Section       

             



                                                                



Social History

                    



                    Social History                         Date                 

        Source      

             

 

                                        Social History ElementQualifiersDate Rep

orted

Diet:

no.  

2017

Tobacco Use:

                                        .   Are you a:: never smoker 

2017

Marital Status:

                                        .  

2017

Caffeine:

yes.  1-2 a day 

2017

Exercise:

yes.  walking

2017

Alcohol:

no.  

2017

Occupation:

employed.  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

2017                         [employer]     

 

             



                                                                    



Family History

                    



                                        No Data Provided for This Section       

             



                                                            



Advance Directives

                    



                                        No Data Provided for This Section       

             



                                                            



Functional Status

                    



                                        No Data Provided for This Section

## 2021-06-22 ENCOUNTER — HOSPITAL ENCOUNTER (EMERGENCY)
Dept: HOSPITAL 88 - ER | Age: 66
Discharge: HOME | End: 2021-06-22
Payer: SELF-PAY

## 2021-06-22 VITALS — HEIGHT: 65 IN | BODY MASS INDEX: 48.82 KG/M2 | WEIGHT: 293 LBS

## 2021-06-22 DIAGNOSIS — I10: ICD-10-CM

## 2021-06-22 DIAGNOSIS — R19.7: ICD-10-CM

## 2021-06-22 DIAGNOSIS — R10.11: Primary | ICD-10-CM

## 2021-06-22 DIAGNOSIS — R11.2: ICD-10-CM

## 2021-06-22 DIAGNOSIS — K80.20: ICD-10-CM

## 2021-06-22 LAB
ALBUMIN SERPL-MCNC: 3.4 G/DL (ref 3.5–5)
ALBUMIN/GLOB SERPL: 0.9 {RATIO} (ref 0.8–2)
ALP SERPL-CCNC: 72 IU/L (ref 40–150)
ALT SERPL-CCNC: 13 IU/L (ref 0–55)
ANION GAP SERPL CALC-SCNC: 12 MMOL/L (ref 8–16)
BACTERIA URNS QL MICRO: (no result) /HPF
BASOPHILS # BLD AUTO: 0.1 10*3/UL (ref 0–0.1)
BASOPHILS NFR BLD AUTO: 0.5 % (ref 0–1)
BUN SERPL-MCNC: 17 MG/DL (ref 7–26)
BUN/CREAT SERPL: 25 (ref 6–25)
CALCIUM SERPL-MCNC: 8.5 MG/DL (ref 8.4–10.2)
CHLORIDE SERPL-SCNC: 107 MMOL/L (ref 98–107)
CLARITY UR: CLEAR
CO2 SERPL-SCNC: 24 MMOL/L (ref 22–29)
COLOR UR: YELLOW
DEPRECATED NEUTROPHILS # BLD AUTO: 6.3 10*3/UL (ref 2.1–6.9)
DEPRECATED RBC URNS MANUAL-ACNC: (no result) /HPF (ref 0–5)
EGFRCR SERPLBLD CKD-EPI 2021: 85 ML/MIN (ref 60–?)
EOSINOPHIL # BLD AUTO: 0.1 10*3/UL (ref 0–0.4)
EOSINOPHIL NFR BLD AUTO: 1.1 % (ref 0–6)
EPI CELLS URNS QL MICRO: (no result) /LPF
ERYTHROCYTE [DISTWIDTH] IN CORD BLOOD: 15.4 % (ref 11.7–14.4)
GLOBULIN PLAS-MCNC: 3.8 G/DL (ref 2.3–3.5)
GLUCOSE SERPLBLD-MCNC: 140 MG/DL (ref 74–118)
HCT VFR BLD AUTO: 40.6 % (ref 34.2–44.1)
HGB BLD-MCNC: 13.5 G/DL (ref 12–16)
KETONES UR QL STRIP.AUTO: NEGATIVE
LEUKOCYTE ESTERASE UR QL STRIP.AUTO: NEGATIVE
LIPASE SERPL-CCNC: 11 U/L (ref 8–78)
LYMPHOCYTES # BLD: 2.9 10*3/UL (ref 1–3.2)
LYMPHOCYTES NFR BLD AUTO: 28.6 % (ref 18–39.1)
MCH RBC QN AUTO: 30.3 PG (ref 28–32)
MCHC RBC AUTO-ENTMCNC: 33.3 G/DL (ref 31–35)
MCV RBC AUTO: 91.2 FL (ref 81–99)
MONOCYTES # BLD AUTO: 0.7 10*3/UL (ref 0.2–0.8)
MONOCYTES NFR BLD AUTO: 6.7 % (ref 4.4–11.3)
NEUTS SEG NFR BLD AUTO: 62.7 % (ref 38.7–80)
NITRITE UR QL STRIP.AUTO: NEGATIVE
PLATELET # BLD AUTO: 306 X10E3/UL (ref 140–360)
POTASSIUM SERPL-SCNC: 4 MMOL/L (ref 3.5–5.1)
PROT UR QL STRIP.AUTO: NEGATIVE
RBC # BLD AUTO: 4.45 X10E6/UL (ref 3.6–5.1)
SODIUM SERPL-SCNC: 139 MMOL/L (ref 136–145)
SP GR UR STRIP: >=1.03 (ref 1.01–1.02)
UROBILINOGEN UR STRIP-MCNC: 1 MG/DL (ref 0.2–1)
WBC #/AREA URNS HPF: (no result) /HPF (ref 0–5)

## 2021-06-22 PROCEDURE — 36415 COLL VENOUS BLD VENIPUNCTURE: CPT

## 2021-06-22 PROCEDURE — 81001 URINALYSIS AUTO W/SCOPE: CPT

## 2021-06-22 PROCEDURE — 83690 ASSAY OF LIPASE: CPT

## 2021-06-22 PROCEDURE — 76705 ECHO EXAM OF ABDOMEN: CPT

## 2021-06-22 PROCEDURE — 99284 EMERGENCY DEPT VISIT MOD MDM: CPT

## 2021-06-22 PROCEDURE — 84484 ASSAY OF TROPONIN QUANT: CPT

## 2021-06-22 PROCEDURE — 80053 COMPREHEN METABOLIC PANEL: CPT

## 2021-06-22 PROCEDURE — 93005 ELECTROCARDIOGRAM TRACING: CPT

## 2021-06-22 PROCEDURE — 85025 COMPLETE CBC W/AUTO DIFF WBC: CPT

## 2021-11-24 ENCOUNTER — HOSPITAL ENCOUNTER (EMERGENCY)
Dept: HOSPITAL 88 - FSED | Age: 66
Discharge: HOME | End: 2021-11-24
Payer: COMMERCIAL

## 2021-11-24 VITALS — BODY MASS INDEX: 48.82 KG/M2 | HEIGHT: 65 IN | WEIGHT: 293 LBS

## 2021-11-24 DIAGNOSIS — I10: ICD-10-CM

## 2021-11-24 DIAGNOSIS — L40.50: ICD-10-CM

## 2021-11-24 DIAGNOSIS — E03.9: ICD-10-CM

## 2021-11-24 DIAGNOSIS — J02.9: Primary | ICD-10-CM

## 2021-11-24 DIAGNOSIS — B34.9: ICD-10-CM

## 2021-11-24 PROCEDURE — 83518 IMMUNOASSAY DIPSTICK: CPT

## 2021-11-24 PROCEDURE — 99282 EMERGENCY DEPT VISIT SF MDM: CPT

## 2022-02-22 ENCOUNTER — HOSPITAL ENCOUNTER (OUTPATIENT)
Dept: HOSPITAL 88 - US | Age: 67
End: 2022-02-22
Attending: FAMILY MEDICINE
Payer: COMMERCIAL

## 2022-02-22 DIAGNOSIS — R22.1: Primary | ICD-10-CM

## 2022-02-22 PROCEDURE — 76536 US EXAM OF HEAD AND NECK: CPT

## 2022-08-02 ENCOUNTER — HOSPITAL ENCOUNTER (OUTPATIENT)
Dept: HOSPITAL 88 - US | Age: 67
End: 2022-08-02
Attending: FAMILY MEDICINE
Payer: COMMERCIAL

## 2022-08-02 DIAGNOSIS — R60.0: Primary | ICD-10-CM

## 2022-08-02 PROCEDURE — 76882 US LMTD JT/FCL EVL NVASC XTR: CPT

## 2023-03-17 ENCOUNTER — HOSPITAL ENCOUNTER (OUTPATIENT)
Dept: HOSPITAL 88 - CT | Age: 68
End: 2023-03-17
Attending: FAMILY MEDICINE
Payer: COMMERCIAL

## 2023-03-17 DIAGNOSIS — Z86.711: Primary | ICD-10-CM

## 2023-03-17 LAB
BUN SERPL-MCNC: 11 MG/DL (ref 7–26)
BUN/CREAT SERPL: 23 (ref 6–25)

## 2023-03-17 PROCEDURE — 36415 COLL VENOUS BLD VENIPUNCTURE: CPT

## 2023-03-17 PROCEDURE — 84520 ASSAY OF UREA NITROGEN: CPT

## 2023-03-17 PROCEDURE — 71260 CT THORAX DX C+: CPT

## 2023-03-17 PROCEDURE — 82565 ASSAY OF CREATININE: CPT

## 2025-04-05 ENCOUNTER — HOSPITAL ENCOUNTER (EMERGENCY)
Dept: HOSPITAL 88 - FSED | Age: 70
Discharge: HOME | End: 2025-04-05
Payer: MEDICARE

## 2025-04-05 VITALS — HEIGHT: 63 IN | BODY MASS INDEX: 45.08 KG/M2 | WEIGHT: 254.44 LBS

## 2025-04-05 VITALS — HEART RATE: 81 BPM | OXYGEN SATURATION: 97 %

## 2025-04-05 VITALS — TEMPERATURE: 99.5 F

## 2025-04-05 DIAGNOSIS — J02.9: ICD-10-CM

## 2025-04-05 DIAGNOSIS — L40.50: ICD-10-CM

## 2025-04-05 DIAGNOSIS — R53.81: ICD-10-CM

## 2025-04-05 DIAGNOSIS — R05.9: Primary | ICD-10-CM

## 2025-04-05 DIAGNOSIS — I10: ICD-10-CM

## 2025-04-05 DIAGNOSIS — E03.9: ICD-10-CM

## 2025-04-05 DIAGNOSIS — Z86.718: ICD-10-CM

## 2025-04-05 PROCEDURE — 83518 IMMUNOASSAY DIPSTICK: CPT

## 2025-04-05 PROCEDURE — 0223U NFCT DS 22 TRGT SARS-COV-2: CPT

## 2025-04-05 PROCEDURE — 99284 EMERGENCY DEPT VISIT MOD MDM: CPT

## 2025-04-05 PROCEDURE — 87400 INFLUENZA A/B EACH AG IA: CPT
